# Patient Record
Sex: FEMALE | Race: WHITE | NOT HISPANIC OR LATINO | Employment: FULL TIME | URBAN - METROPOLITAN AREA
[De-identification: names, ages, dates, MRNs, and addresses within clinical notes are randomized per-mention and may not be internally consistent; named-entity substitution may affect disease eponyms.]

---

## 2020-04-01 RX ORDER — CLORAZEPATE DIPOTASSIUM 7.5 MG/1
7.5 TABLET ORAL EVERY 8 HOURS PRN
Qty: 28 TABLET | Refills: 1 | Status: SHIPPED | OUTPATIENT
Start: 2020-04-01 | End: 2020-04-03 | Stop reason: SDUPTHER

## 2020-04-01 RX ORDER — CLORAZEPATE DIPOTASSIUM 7.5 MG/1
7.5 TABLET ORAL EVERY 8 HOURS PRN
COMMUNITY
End: 2020-04-01 | Stop reason: SDUPTHER

## 2020-04-01 NOTE — TELEPHONE ENCOUNTER
----- Message from Britt Chang sent at 4/1/2020  3:58 PM CDT -----  Contact: patient  Type:  RX Refill Request    Who Called: patient  Refill or New Rx:refill  RX Name and Strength: Clorazepate 7.5 mg  How is the patient currently taking it? (ex. 1XDay): as needed  Is this a 30 day or 90 day RX: 28 tablets  Preferred Pharmacy with phone number:[facility]  Local or Mail Order:local  Ordering Provider:Jalen Johns  Would the patient rather a call back or a response via [facility]? Call back  Best Call Back Number:[phone]  Additional Information: pt stated that [facility]

## 2020-04-01 NOTE — TELEPHONE ENCOUNTER
PATIENT REQUESTING REFILL ON CLORAZEPATE. PATIENT LAST ANNUAL WAS 1-10-19. ALLERGIES MARKED AS REVIEWED. MEDICATION PENDING. PHARMACY UP TO DATE. PLEASE ADVISE.

## 2020-04-03 RX ORDER — CLORAZEPATE DIPOTASSIUM 7.5 MG/1
7.5 TABLET ORAL EVERY 8 HOURS PRN
Qty: 28 TABLET | Refills: 1 | Status: SHIPPED | OUTPATIENT
Start: 2020-04-03 | End: 2020-05-12 | Stop reason: SDUPTHER

## 2020-04-03 NOTE — TELEPHONE ENCOUNTER
----- Message from Fran Lorenzana sent at 4/3/2020 10:31 AM CDT -----  Contact: Pt  Mary Jane called to check on the status of her medication refill request from 2 days ago [phone]. Clorazepate 7.5mg.

## 2020-05-12 DIAGNOSIS — F41.9 ANXIETY: Primary | ICD-10-CM

## 2020-05-12 RX ORDER — CLORAZEPATE DIPOTASSIUM 7.5 MG/1
7.5 TABLET ORAL EVERY 8 HOURS PRN
Qty: 28 TABLET | Refills: 1 | Status: SHIPPED | OUTPATIENT
Start: 2020-05-12 | End: 2020-06-22 | Stop reason: SDUPTHER

## 2020-05-12 NOTE — TELEPHONE ENCOUNTER
PATIENT REQUESTING REFILL ON CLORAZEPATE. ALLERGIES MARKED AS REVIEWED. MEDICATION PENDING. PHARMACY UP TO DATE. PLEASE ADVISE.

## 2020-05-12 NOTE — TELEPHONE ENCOUNTER
----- Message from Fran Lorenzana sent at 5/12/2020  3:42 PM CDT -----  Contact: Pt  Type:  RX Refill Request    Who Called: Mary Jane  Refill or New Rx:Refill  RX Name and Strength:Clorazepate 7.5mg  How is the patient currently taking it? (ex. 1XDay):3xday prn  Is this a 30 day or 90 day RX:30  Preferred Pharmacy with phone number:  [facility] #[id number] - [place], [place] - [address] AT [facility] [address]  [address]  [place] [address]  Phone: [phone] Fax: [phone]  Local or Mail Order:Local  Ordering Provider:Dr. Johns  Would the patient rather a call back or a response via [facility]? Call back  Best Call Back Number:[phone]  Additional Information: na

## 2020-06-15 ENCOUNTER — OFFICE VISIT (OUTPATIENT)
Dept: OBSTETRICS AND GYNECOLOGY | Facility: CLINIC | Age: 35
End: 2020-06-15
Payer: COMMERCIAL

## 2020-06-15 VITALS — SYSTOLIC BLOOD PRESSURE: 122 MMHG | HEART RATE: 67 BPM | WEIGHT: 150 LBS | DIASTOLIC BLOOD PRESSURE: 87 MMHG

## 2020-06-15 DIAGNOSIS — Z01.419 WELL WOMAN EXAM: Primary | ICD-10-CM

## 2020-06-15 DIAGNOSIS — N94.6 DYSMENORRHEA: ICD-10-CM

## 2020-06-15 DIAGNOSIS — N92.0 MENORRHAGIA WITH REGULAR CYCLE: ICD-10-CM

## 2020-06-15 PROCEDURE — 99395 PREV VISIT EST AGE 18-39: CPT | Mod: S$GLB,,, | Performed by: OBSTETRICS & GYNECOLOGY

## 2020-06-15 PROCEDURE — 99395 PR PREVENTIVE VISIT,EST,18-39: ICD-10-PCS | Mod: S$GLB,,, | Performed by: OBSTETRICS & GYNECOLOGY

## 2020-06-15 RX ORDER — FLUOXETINE HYDROCHLORIDE 20 MG/1
20 CAPSULE ORAL DAILY
Qty: 28 CAPSULE | Refills: 6 | Status: SHIPPED | OUTPATIENT
Start: 2020-06-15 | End: 2021-06-17

## 2020-06-15 NOTE — PROGRESS NOTES
Subjective:       Patient ID: Mary Jane Gary is a 35 y.o. female.    Chief Complaint:  Well Woman (1/10/19 NEGATIVE)      History of Present Illness  She is doing well.  No new health issues,  No abnormal bleeding, No GI or Gu concerns,  No dyspareunia  Her cycles are very heavy sig cramps   and her pms is really bad.    Her sx start  At least 6 days before her cycle.   She feels overwhelmed and impatient.  Wants to jump out of her skin .   her   had a vasectomy      GYN & OB History  No LMP recorded.     Date of Last Pap: No result found    OB History    Para Term  AB Living   3 3 3         SAB TAB Ectopic Multiple Live Births                  # Outcome Date GA Lbr Jonathan/2nd Weight Sex Delivery Anes PTL Lv   3 Term 19     Vag-Spont      2 Term 16 39w0d   M Vag-Spont      1 Term 06 38w0d   M Vag-Spont          Review of Systems  Review of Systems   Constitutional: Negative for activity change.   Eyes: Negative for visual disturbance.   Respiratory: Negative for shortness of breath.    Cardiovascular: Negative for chest pain.   Gastrointestinal: Negative for abdominal pain.   Genitourinary: Negative for vaginal bleeding.        No abnormal vaginal bleeding   Musculoskeletal: Negative for back pain.   Integumentary:  Negative for rash and breast mass.   Neurological: Negative for numbness.   Psychiatric/Behavioral:        No mood disturbance or changes    Breast: Negative for mass            Objective:    Physical Exam:   Constitutional: She is oriented to person, place, and time. She appears well-developed. She is cooperative.    HENT:   Head: Normocephalic.     Neck: Trachea normal. Neck supple. No thyromegaly present.    Cardiovascular: Regular rhythm and normal heart sounds.     Pulmonary/Chest: Effort normal and breath sounds normal. Right breast exhibits no mass, no nipple discharge and no skin change. Left breast exhibits no mass, no nipple discharge and no skin change.    Bilateral fibrocystic changes  During the exam self breast exams discussed         Abdominal: Soft. Normal appearance. There is no abdominal tenderness. There is no rebound and no guarding.     Genitourinary:    Vagina and uterus normal.      Pelvic exam was performed with patient supine.   There is no lesion on the right labia. There is no lesion on the left labia. Uterus is not enlarged and not tender. Cervix is normal. Right adnexum displays no mass and no tenderness. Left adnexum displays no mass and no tenderness. No rectocele, cystocele or unspecified prolapse of vaginal walls in the vagina. Labial bartholins normal.Cervix exhibits no discharge. Additional cervical findings: pap smear done             Lymphadenopathy:        Head (right side): No submental and no submandibular adenopathy present.        Head (left side): No submental and no submandibular adenopathy present.     She has no cervical adenopathy.    Neurological: She is alert and oriented to person, place, and time.    Skin: Skin is warm.    Psychiatric: She has a normal mood and affect. Her speech is normal and behavior is normal. Thought content normal.          Assessment:        1. Well woman exam    2. Dysmenorrhea    3. Menorrhagia with regular cycle                 Plan:           She is doing wel but discussed her pms and her menorrhagia and dysmenorrhea   Will try prozac.  14 days each month  Pap discussed will return for her annual     Pt is aware we call all results. If she does not hear from our office regarding her result within a week of having a study or procedure performed she is to call the office so that we can research the result for her.  Birth control discussed  Chaperone was present

## 2020-06-22 DIAGNOSIS — F41.9 ANXIETY: Primary | ICD-10-CM

## 2020-06-22 RX ORDER — CLORAZEPATE DIPOTASSIUM 7.5 MG/1
7.5 TABLET ORAL EVERY 8 HOURS PRN
Qty: 28 TABLET | Refills: 1 | Status: SHIPPED | OUTPATIENT
Start: 2020-06-22 | End: 2020-06-23 | Stop reason: SDUPTHER

## 2020-06-22 NOTE — TELEPHONE ENCOUNTER
----- Message from Fran Lorenzana sent at 6/22/2020 10:56 AM CDT -----  Regarding: Refill  Type:  RX Refill Request    Who Called: Mary Jane  Refill or New Rx:Refill  RX Name and Strength:Clorazepate 7.5mg  How is the patient currently taking it? (ex. 1XDay):1 q8hrs  Is this a 30 day or 90 day RX:30  Preferred Pharmacy with phone number:  [facility] #[id number] - [place], [place] - [address] AT [facility] [address]  [address]  [place] [address]  Phone: [phone] Fax: [phone]  Local or Mail Order:Local  Ordering Provider:Dr. Johns  Would the patient rather a call back or a response via [facility]? Call back  Best Call Back Number: [phone] (home)  Additional Information: na

## 2020-06-23 DIAGNOSIS — F41.9 ANXIETY: Primary | ICD-10-CM

## 2020-06-23 RX ORDER — CLORAZEPATE DIPOTASSIUM 7.5 MG/1
7.5 TABLET ORAL 2 TIMES DAILY PRN
Qty: 28 TABLET | Refills: 1 | Status: SHIPPED | OUTPATIENT
Start: 2020-06-23 | End: 2020-07-27 | Stop reason: SDUPTHER

## 2020-06-23 NOTE — TELEPHONE ENCOUNTER
----- Message from Anne Marie Sequeira sent at 6/23/2020  3:52 PM CDT -----  Regarding: refill  Contact: Patient  Type:  RX Refill Request    Who Called: patient   Refill or New Rx:refill   RX Name and Strength:clorazepate (TRANXENE) 7.5 MG Tab  How is the patient currently taking it? (ex. 1XDay): 1xday  Is this a 30 day or 90 day RX:30  Preferred Pharmacy with phone number:[facility]   Local or Mail Order: local   Ordering Provider:Jalen Johns   Would the patient rather a call back or a response via [facility]? Call back   Best Call Back Number:[phone]  Additional Information: Patient is stating she has a dentist appointment in the morning and need to have this med called in today.

## 2020-06-23 NOTE — TELEPHONE ENCOUNTER
PATIENT REQUESTING REFILL ON TRANXENE. ALLERGIES MARKED AS REVIEWED. MEDICATION PENDING. PHARMACY UP TO DATE. PLEASE ADVISE.

## 2020-07-27 DIAGNOSIS — F41.9 ANXIETY: ICD-10-CM

## 2020-07-27 RX ORDER — CLORAZEPATE DIPOTASSIUM 7.5 MG/1
7.5 TABLET ORAL 2 TIMES DAILY PRN
Qty: 28 TABLET | Refills: 1 | Status: SHIPPED | OUTPATIENT
Start: 2020-07-27 | End: 2020-09-01 | Stop reason: SDUPTHER

## 2020-07-27 NOTE — TELEPHONE ENCOUNTER
----- Message from Anne Marie Sequeira sent at 7/27/2020  2:11 PM CDT -----  Type:  RX Refill Request    Who Called: patient  Refill or New Rx:refill  RX Name and Strength: clorazepate (TRANXENE) 7.5 MG Tab   How is the patient currently taking it? (ex. 1XDay):1xday  Is this a 30 day or 90 day RX: 30  Preferred Pharmacy with phone number:[facility] #[id number] [place] [address] AT [facility] [address] [phone] (Phone)  [phone] (Fax)      Local or Mail Order: local  Ordering Provider:Dr Johns   Would the patient rather a call back or a response via [facility]? Call back   Best Call Back Number:[phone]   Additional Information:

## 2020-07-27 NOTE — TELEPHONE ENCOUNTER
----- Message from Breannefidelina Bernardo sent at 7/24/2020  4:01 PM CDT -----  Regarding: patient refill  Contact: patient  Type:  RX Refill Request    Who Called: patient  Refill or New Rx:refill  RX Name and Strength: clorazepate (TRANXENE) 7.5 MG Tab  How is the patient currently taking it? (ex. 1XDay):once every 8 hours as needed  Is this a 30 day or 90 day RX:30day  Preferred Pharmacy with phone number:  [facility] #[id number] - [place], [place] - [address] AT [facility] [address]  [address]  [place] [address]  Phone: [phone] Fax: [phone]  Local or Mail Order:local  Ordering Provider:Jalen Johns  Would the patient rather a call back or a response via [facility]? Call back  Best Call Back Number:[phone]  Additional Information: n/a

## 2020-09-01 DIAGNOSIS — F41.9 ANXIETY: ICD-10-CM

## 2020-09-01 RX ORDER — CLORAZEPATE DIPOTASSIUM 7.5 MG/1
7.5 TABLET ORAL 2 TIMES DAILY PRN
Qty: 28 TABLET | Refills: 1 | Status: SHIPPED | OUTPATIENT
Start: 2020-09-01 | End: 2020-09-02 | Stop reason: SDUPTHER

## 2020-09-02 RX ORDER — CLORAZEPATE DIPOTASSIUM 7.5 MG/1
7.5 TABLET ORAL 2 TIMES DAILY PRN
Qty: 28 TABLET | Refills: 1 | Status: SHIPPED | OUTPATIENT
Start: 2020-09-02 | End: 2020-10-05 | Stop reason: SDUPTHER

## 2020-10-05 DIAGNOSIS — F41.9 ANXIETY: ICD-10-CM

## 2020-10-05 RX ORDER — CLORAZEPATE DIPOTASSIUM 7.5 MG/1
7.5 TABLET ORAL 2 TIMES DAILY PRN
Qty: 28 TABLET | Refills: 1 | Status: SHIPPED | OUTPATIENT
Start: 2020-10-05 | End: 2020-10-22 | Stop reason: SDUPTHER

## 2020-10-05 NOTE — TELEPHONE ENCOUNTER
----- Message from Anne Marie Sequeira sent at 10/5/2020  3:47 PM CDT -----  Patient need to have medication refill  on clorazepate (TRANXENE) 7.5 MG Tab . Call back number [phone]

## 2020-10-22 DIAGNOSIS — F41.9 ANXIETY: ICD-10-CM

## 2020-10-22 RX ORDER — CLORAZEPATE DIPOTASSIUM 7.5 MG/1
7.5 TABLET ORAL 2 TIMES DAILY PRN
Qty: 28 TABLET | Refills: 1 | Status: SHIPPED | OUTPATIENT
Start: 2020-10-22 | End: 2020-11-13 | Stop reason: SDUPTHER

## 2020-10-22 NOTE — TELEPHONE ENCOUNTER
----- Message from Aissatou Duke sent at 10/21/2020  2:01 PM CDT -----  .Type:  RX Refill Request    Who Called:  Patient   Refill or New Rx: refill   RX Name and Strength:  clorazepate (TRANXENE) 7.5 MG Tab   How is the patient currently taking it? (ex. 1XDay):  Is this a 30 day or 90 day RX:  Preferred Pharmacy with phone number:   [facility] #[id number] [place], [place] - [address] [place] [address] & [address] [address]  Phone: [phone] Fax: [phone]      Local or Mail Order: local   Ordering Provider: dr. Johns   Would the patient rather a call back or a response via [facility]?  call  Best Call Back Number: [phone]  Additional Information:  n/a

## 2020-10-22 NOTE — TELEPHONE ENCOUNTER
Pt requesting refill on Tranxene 7.5mg.   Medication pending.   Pharmacy up to date.   Please advise.  Thank you!

## 2020-11-09 ENCOUNTER — TELEPHONE (OUTPATIENT)
Dept: OBSTETRICS AND GYNECOLOGY | Facility: CLINIC | Age: 35
End: 2020-11-09

## 2020-11-09 NOTE — TELEPHONE ENCOUNTER
----- Message from Britt Chang sent at 11/9/2020 11:08 AM CST -----  Regarding: patient refill  Contact: patient  Type:  RX Refill Request    Who Called: patient  Refill or New Rx:refill  RX Name and Strength: clorazepate (TRANXENE) 7.5 MG Tab  How is the patient currently taking it? (ex. 1XDay):every 8 hours as needed  Is this a 30 day or 90 day Rx:14 day supply (quantity of 28)  Preferred Pharmacy with phone number: [facility] [address]  Local or Mail Order:local  Ordering Provider:Jalen Johns  Would the patient rather a call back or a response via [facility]? Call back  Best Call Back Number:[phone]   Additional Information: patient is back in [place] and would like to [facility] the [facility] off her chart. Please make sure to send prescription to [facility]

## 2020-11-11 DIAGNOSIS — F41.9 ANXIETY: Primary | ICD-10-CM

## 2020-11-12 RX ORDER — ESCITALOPRAM OXALATE 10 MG/1
10 TABLET ORAL DAILY
Qty: 90 TABLET | Refills: 2 | Status: SHIPPED | OUTPATIENT
Start: 2020-11-12 | End: 2021-06-17

## 2020-11-12 NOTE — TELEPHONE ENCOUNTER
----- Message from Camille Lewis sent at 11/11/2020  3:09 PM CST -----  Type:  RX Refill Request    Who Called: pt   Refill or New Rx:refill  RX Name and Strength:clorazepate (TRANXENE) 7.5 MG Tab  How is the patient currently taking it? (ex. 1XDay):as need   Is this a 30 day or 90 day RX:30  Preferred Pharmacy with phone number:  Local or Mail Order:local   Ordering Provider:veda   Would the patient rather a call back or a response via [facility]? Callback   Best Call Back Number:[phone]   Additional Information: please update patient Pharmacy to the one listed below             please update patient Pharmacy to the one listed below         [facility]  [address] [place] [address]  [phone]
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
## 2020-11-12 NOTE — TELEPHONE ENCOUNTER
FP    Preferred Name: Mary Jane Gary   Female, 35 y.o., 1985   Phone: [phone] (M)   PCP: Christian Navas MD   Language: English   Need Interp: No   Allergies Last Reviewed: 10/05/20   Allergies:   No Known Allergies   Digital Medicine: None   Health Maintenance: Due   Primary Ins.: [facility]   MRN: [id number]   Pt Comm Pref: Patient Portal, Mail            Next Appt:   With Obstetrics and Gynecology (Jalen Johns III, MD)  06/17/2021 at 10:00 AM   My Sticky Note:        Specialty Comments:   Last Encounter Center: None   Last Enc in Dept: None   Last Enc this Prov: 6/15/2020   Last Contact Department: [facility] OBSTETRICS AND GYNECOLOGY [address]           Message  Received: Yesterday   Message Contents   MD Lilliam Haq III, LPN   Caller: Unspecified (3 days ago, 5:37 PM)           Please call the pt I am concerned regarding the frequency of refills. Consider increasing the dose of prozac to 40 mg q day      Previous Messages                              Patient Calls  Jalen Johns III, MD You 16 hours ago (4:41 PM)     Please call the pt I am concerned regarding the frequency of refills. Consider increasing the dose of prozac to 40 mg q day      Message text             You routed conversation to Jalen Johns III, MD 3 days ago          You 3 days ago     DR JOHNS PLEASE NOTE THE AMOUNT OF PRESCRIPTIONS PATIENT HAS FILLED         Documentation             You 3 days ago     ----- Message from Britt Chang sent at 11/9/2020 11:08 AM CST -----   Regarding: patient refill   Contact: patient   Type: RX Refill Request   Who Called: patient   Refill or New Rx:refill   RX Name and Strength: clorazepate (TRANXENE) 7.5 MG Tab   How is the patient currently taking it? (ex. 1XDay):every 8 hours as needed   Is this a 30 day or 90 day Rx:14 day supply (quantity of 28)   Preferred Pharmacy with phone number: [facility] [address]   Local or Mail Order:local    Ordering Provider:Jalen Johns   Would the patient rather a call back or a response via [facility]? Call back   Best Call Back Number:[phone]   Additional Information: patient is back in [place] and [facility]. [facility] [address]         Documentation

## 2020-11-13 DIAGNOSIS — F41.9 ANXIETY: ICD-10-CM

## 2020-11-13 RX ORDER — CLORAZEPATE DIPOTASSIUM 7.5 MG/1
7.5 TABLET ORAL 2 TIMES DAILY PRN
Qty: 28 TABLET | Refills: 1 | Status: SHIPPED | OUTPATIENT
Start: 2020-11-13 | End: 2021-01-11 | Stop reason: SDUPTHER

## 2020-11-13 NOTE — TELEPHONE ENCOUNTER
Patient received Rx for Lexapro  She is requesting another refill of tranxene to help her through a very stressful time

## 2020-11-13 NOTE — TELEPHONE ENCOUNTER
----- Message from Janae Celestin sent at 11/13/2020 11:13 AM CST -----  Regarding: meds status  Good morning,      Pt stated that all of her meds where not sent over to her pharmacy only one . Pt would like a call back at [phone]      Thanks,  Janae Celestin

## 2021-01-11 DIAGNOSIS — F41.9 ANXIETY: ICD-10-CM

## 2021-01-11 RX ORDER — CLORAZEPATE DIPOTASSIUM 7.5 MG/1
7.5 TABLET ORAL 2 TIMES DAILY PRN
Qty: 28 TABLET | Refills: 1 | Status: SHIPPED | OUTPATIENT
Start: 2021-01-11 | End: 2021-02-17 | Stop reason: SDUPTHER

## 2021-02-17 DIAGNOSIS — F41.9 ANXIETY: ICD-10-CM

## 2021-02-17 RX ORDER — CLORAZEPATE DIPOTASSIUM 7.5 MG/1
7.5 TABLET ORAL 2 TIMES DAILY PRN
Qty: 28 TABLET | Refills: 1 | Status: SHIPPED | OUTPATIENT
Start: 2021-02-17 | End: 2021-03-24 | Stop reason: SDUPTHER

## 2021-03-24 DIAGNOSIS — F41.9 ANXIETY: ICD-10-CM

## 2021-03-24 RX ORDER — CLORAZEPATE DIPOTASSIUM 7.5 MG/1
7.5 TABLET ORAL 2 TIMES DAILY PRN
Qty: 28 TABLET | Refills: 1 | Status: SHIPPED | OUTPATIENT
Start: 2021-03-24 | End: 2021-04-27 | Stop reason: SDUPTHER

## 2021-04-27 DIAGNOSIS — F41.9 ANXIETY: ICD-10-CM

## 2021-04-27 RX ORDER — CLORAZEPATE DIPOTASSIUM 7.5 MG/1
7.5 TABLET ORAL 2 TIMES DAILY PRN
Qty: 28 TABLET | Refills: 1 | Status: SHIPPED | OUTPATIENT
Start: 2021-04-27 | End: 2021-06-02 | Stop reason: SDUPTHER

## 2021-06-02 DIAGNOSIS — F41.9 ANXIETY: ICD-10-CM

## 2021-06-02 RX ORDER — CLORAZEPATE DIPOTASSIUM 7.5 MG/1
7.5 TABLET ORAL 2 TIMES DAILY PRN
Qty: 28 TABLET | Refills: 1 | Status: SHIPPED | OUTPATIENT
Start: 2021-06-02 | End: 2021-06-17 | Stop reason: SDUPTHER

## 2021-06-17 ENCOUNTER — OFFICE VISIT (OUTPATIENT)
Dept: OBSTETRICS AND GYNECOLOGY | Facility: CLINIC | Age: 36
End: 2021-06-17
Payer: COMMERCIAL

## 2021-06-17 VITALS
BODY MASS INDEX: 26.2 KG/M2 | WEIGHT: 163 LBS | HEIGHT: 66 IN | HEART RATE: 84 BPM | SYSTOLIC BLOOD PRESSURE: 128 MMHG | DIASTOLIC BLOOD PRESSURE: 85 MMHG

## 2021-06-17 DIAGNOSIS — Z01.419 WELL WOMAN EXAM WITH ROUTINE GYNECOLOGICAL EXAM: Primary | ICD-10-CM

## 2021-06-17 DIAGNOSIS — F41.9 ANXIETY: ICD-10-CM

## 2021-06-17 DIAGNOSIS — N94.3 PMS (PREMENSTRUAL SYNDROME): ICD-10-CM

## 2021-06-17 PROCEDURE — 99395 PREV VISIT EST AGE 18-39: CPT | Mod: S$GLB,,, | Performed by: OBSTETRICS & GYNECOLOGY

## 2021-06-17 PROCEDURE — 3008F BODY MASS INDEX DOCD: CPT | Mod: CPTII,S$GLB,, | Performed by: OBSTETRICS & GYNECOLOGY

## 2021-06-17 PROCEDURE — 99395 PR PREVENTIVE VISIT,EST,18-39: ICD-10-PCS | Mod: S$GLB,,, | Performed by: OBSTETRICS & GYNECOLOGY

## 2021-06-17 PROCEDURE — 3008F PR BODY MASS INDEX (BMI) DOCUMENTED: ICD-10-PCS | Mod: CPTII,S$GLB,, | Performed by: OBSTETRICS & GYNECOLOGY

## 2021-06-17 RX ORDER — CLORAZEPATE DIPOTASSIUM 7.5 MG/1
7.5 TABLET ORAL 2 TIMES DAILY PRN
Qty: 28 TABLET | Refills: 5 | Status: SHIPPED | OUTPATIENT
Start: 2021-06-17 | End: 2021-11-10 | Stop reason: SDUPTHER

## 2021-11-10 DIAGNOSIS — F41.9 ANXIETY: ICD-10-CM

## 2021-11-10 RX ORDER — CLORAZEPATE DIPOTASSIUM 7.5 MG/1
7.5 TABLET ORAL 2 TIMES DAILY PRN
Qty: 28 TABLET | Refills: 1 | Status: SHIPPED | OUTPATIENT
Start: 2021-11-10 | End: 2022-01-20 | Stop reason: SDUPTHER

## 2021-12-21 ENCOUNTER — TELEPHONE (OUTPATIENT)
Dept: OBSTETRICS AND GYNECOLOGY | Facility: CLINIC | Age: 36
End: 2021-12-21
Payer: COMMERCIAL

## 2021-12-21 ENCOUNTER — TELEPHONE (OUTPATIENT)
Dept: OBSTETRICS AND GYNECOLOGY | Facility: CLINIC | Age: 36
End: 2021-12-21

## 2021-12-21 ENCOUNTER — OFFICE VISIT (OUTPATIENT)
Dept: OBSTETRICS AND GYNECOLOGY | Facility: CLINIC | Age: 36
End: 2021-12-21
Payer: COMMERCIAL

## 2021-12-21 VITALS
WEIGHT: 163 LBS | HEART RATE: 118 BPM | DIASTOLIC BLOOD PRESSURE: 91 MMHG | HEIGHT: 66 IN | SYSTOLIC BLOOD PRESSURE: 128 MMHG | BODY MASS INDEX: 26.2 KG/M2

## 2021-12-21 DIAGNOSIS — K64.4 EXTERNAL HEMORRHOID: Primary | ICD-10-CM

## 2021-12-21 PROCEDURE — 99213 OFFICE O/P EST LOW 20 MIN: CPT | Mod: S$GLB,,, | Performed by: OBSTETRICS & GYNECOLOGY

## 2021-12-21 PROCEDURE — 99213 PR OFFICE/OUTPT VISIT, EST, LEVL III, 20-29 MIN: ICD-10-PCS | Mod: S$GLB,,, | Performed by: OBSTETRICS & GYNECOLOGY

## 2021-12-21 RX ORDER — HYDROCODONE BITARTRATE AND ACETAMINOPHEN 5; 325 MG/1; MG/1
1 TABLET ORAL EVERY 6 HOURS PRN
Qty: 18 TABLET | Refills: 0 | Status: SHIPPED | OUTPATIENT
Start: 2021-12-21 | End: 2022-06-20

## 2021-12-21 RX ORDER — HYDROCORTISONE ACETATE 25 MG/1
25 SUPPOSITORY RECTAL NIGHTLY PRN
Qty: 4 SUPPOSITORY | Refills: 1 | Status: SHIPPED | OUTPATIENT
Start: 2021-12-21 | End: 2022-09-11

## 2022-01-20 ENCOUNTER — TELEPHONE (OUTPATIENT)
Dept: OBSTETRICS AND GYNECOLOGY | Facility: CLINIC | Age: 37
End: 2022-01-20
Payer: COMMERCIAL

## 2022-01-20 DIAGNOSIS — F41.9 ANXIETY: ICD-10-CM

## 2022-01-20 RX ORDER — CLORAZEPATE DIPOTASSIUM 7.5 MG/1
7.5 TABLET ORAL 2 TIMES DAILY PRN
Qty: 28 TABLET | Refills: 1 | Status: SHIPPED | OUTPATIENT
Start: 2022-01-20 | End: 2022-01-25

## 2022-01-20 NOTE — TELEPHONE ENCOUNTER
----- Message from Anne Marie Sequeira sent at 1/20/2022 12:04 PM CST -----  Patient calling for refill on medication, clorazepate (TRANXENE) 7.5 MG [place]    [facility] #[id number] - [place], [place] - [address] AT [facility] [address]  [address]  [place] [address]  Phone: [phone] Fax: [phone]       Call back number [phone]

## 2022-01-20 NOTE — TELEPHONE ENCOUNTER
----- Message from Shattuck sent at 1/20/2022  2:01 PM CST -----  rigoberto states that clorazepate 7.5mg out of stock in [facility], please amend..[phone] (home)

## 2022-01-25 DIAGNOSIS — F41.9 ANXIETY: ICD-10-CM

## 2022-01-25 RX ORDER — CLORAZEPATE DIPOTASSIUM 7.5 MG/1
7.5 TABLET ORAL 2 TIMES DAILY PRN
Qty: 28 TABLET | Refills: 1 | Status: SHIPPED | OUTPATIENT
Start: 2022-01-25 | End: 2022-02-23 | Stop reason: SDUPTHER

## 2022-01-25 NOTE — TELEPHONE ENCOUNTER
----- Message from Bettye Madrid sent at 1/25/2022  9:17 AM CST -----  Contact: pt  Pt states the medicine that was called in to [facility]  / pt wants to have it transferred to [facility] and can be reached at [phone]//thanks/[facility]  [phone]

## 2022-02-23 DIAGNOSIS — F41.9 ANXIETY: ICD-10-CM

## 2022-02-23 RX ORDER — CLORAZEPATE DIPOTASSIUM 7.5 MG/1
7.5 TABLET ORAL 2 TIMES DAILY PRN
Qty: 28 TABLET | Refills: 1 | Status: SHIPPED | OUTPATIENT
Start: 2022-02-23 | End: 2022-03-30

## 2022-02-23 NOTE — TELEPHONE ENCOUNTER
----- Message from Anne Marie Sequeira sent at 2/23/2022 11:06 AM CST -----  Patient calling for refill on medication,. clorazepate (TRANXENE) 7.5 MG [place]      [facility]. - [address] [address]  Phone: [phone] Fax: [phone]     Call back number [phone]. Micahs

## 2022-04-13 DIAGNOSIS — R19.7 DIARRHEA: Primary | ICD-10-CM

## 2022-04-19 DIAGNOSIS — F41.9 ANXIETY: ICD-10-CM

## 2022-04-19 RX ORDER — CLORAZEPATE DIPOTASSIUM 7.5 MG/1
7.5 TABLET ORAL 2 TIMES DAILY PRN
Qty: 28 TABLET | Refills: 0 | Status: SHIPPED | OUTPATIENT
Start: 2022-04-19 | End: 2022-05-09 | Stop reason: SDUPTHER

## 2022-05-09 DIAGNOSIS — F41.9 ANXIETY: ICD-10-CM

## 2022-05-09 RX ORDER — CLORAZEPATE DIPOTASSIUM 7.5 MG/1
7.5 TABLET ORAL 2 TIMES DAILY PRN
Qty: 28 TABLET | Refills: 2 | Status: SHIPPED | OUTPATIENT
Start: 2022-05-09 | End: 2022-06-20

## 2022-05-09 NOTE — TELEPHONE ENCOUNTER
----- Message from Anne Marie Sequeira sent at 5/9/2022  8:12 AM CDT -----  Patient calling for refill on medication, clorazepate (TRANXENE) 7.5 MG Tab    Patient requesting 2 refills on this medication    [facility]. - [address] [address]  Phone: [phone] Fax: [phone]     Call back number [phone].Shavon

## 2022-06-20 ENCOUNTER — OFFICE VISIT (OUTPATIENT)
Dept: OBSTETRICS AND GYNECOLOGY | Facility: CLINIC | Age: 37
End: 2022-06-20
Payer: COMMERCIAL

## 2022-06-20 VITALS
HEIGHT: 66 IN | SYSTOLIC BLOOD PRESSURE: 143 MMHG | WEIGHT: 149 LBS | DIASTOLIC BLOOD PRESSURE: 101 MMHG | BODY MASS INDEX: 23.95 KG/M2 | HEART RATE: 98 BPM

## 2022-06-20 DIAGNOSIS — Z01.419 WELL WOMAN EXAM WITH ROUTINE GYNECOLOGICAL EXAM: Primary | ICD-10-CM

## 2022-06-20 PROCEDURE — 99395 PR PREVENTIVE VISIT,EST,18-39: ICD-10-PCS | Mod: S$GLB,,, | Performed by: OBSTETRICS & GYNECOLOGY

## 2022-06-20 PROCEDURE — 99395 PREV VISIT EST AGE 18-39: CPT | Mod: S$GLB,,, | Performed by: OBSTETRICS & GYNECOLOGY

## 2022-06-20 RX ORDER — DEXTROAMPHETAMINE SACCHARATE, AMPHETAMINE ASPARTATE, DEXTROAMPHETAMINE SULFATE AND AMPHETAMINE SULFATE 7.5; 7.5; 7.5; 7.5 MG/1; MG/1; MG/1; MG/1
TABLET ORAL
COMMUNITY
Start: 2022-05-31

## 2022-06-20 RX ORDER — ONDANSETRON 8 MG/1
TABLET, ORALLY DISINTEGRATING ORAL
COMMUNITY
Start: 2022-06-09

## 2022-06-20 RX ORDER — CLORAZEPATE DIPOTASSIUM 15 MG/1
7.5 TABLET ORAL DAILY
Qty: 28 TABLET | Refills: 2 | Status: SHIPPED | OUTPATIENT
Start: 2022-06-20 | End: 2022-07-07

## 2022-06-20 RX ORDER — CLORAZEPATE DIPOTASSIUM 15 MG/1
TABLET ORAL
COMMUNITY
Start: 2022-06-09 | End: 2022-06-20 | Stop reason: SDUPTHER

## 2022-06-20 NOTE — PROGRESS NOTES
Subjective:       Patient ID: Mary Jane Gary is a 37 y.o. female.    Chief Complaint:  Well Woman      History of Present Illness  She is doing well.  No new health issues,  No abnormal bleeding, No GI or Gu concerns,  No dyspareunia  Reg menses  Manageable.   occ cramps.  Vasectomy.     .   HPI      GYN & OB History  No LMP recorded.     Date of Last Pap: No result found    OB History    Para Term  AB Living   3 3 3         SAB IAB Ectopic Multiple Live Births                  # Outcome Date GA Lbr Jonathan/2nd Weight Sex Delivery Anes PTL Lv   3 Term 19     Vag-Spont      2 Term 16 39w0d   M Vag-Spont      1 Term 06 38w0d   M Vag-Spont          Review of Systems  Review of Systems   Constitutional: Negative for activity change.   Eyes: Negative for visual disturbance.   Respiratory: Negative for shortness of breath.    Cardiovascular: Negative for chest pain.   Gastrointestinal: Negative for abdominal pain.   Genitourinary: Negative for vaginal bleeding.        No abnormal vaginal bleeding   Musculoskeletal: Negative for back pain.   Integumentary:  Negative for rash and breast mass.   Neurological: Negative for numbness.   Psychiatric/Behavioral:        No mood disturbance or changes    Breast: Negative for mass            Objective:    Physical Exam:   Constitutional: She is oriented to person, place, and time. She appears well-developed. She is cooperative.    HENT:   Head: Normocephalic.     Neck: Trachea normal. No thyromegaly present.    Cardiovascular: Normal rate, regular rhythm and normal heart sounds.     Pulmonary/Chest: Effort normal and breath sounds normal. Right breast exhibits no mass, no nipple discharge and no skin change. Left breast exhibits no mass, no nipple discharge and no skin change.        Abdominal: Soft. There is no abdominal tenderness. There is no rebound and no guarding.     Genitourinary:    Vagina and uterus normal.      Pelvic exam was performed  with patient supine.   Labial bartholins normal.There is no lesion on the right labia. There is no lesion on the left labia. Cervix is normal. Right adnexum displays no mass and no tenderness. Left adnexum displays no mass and no tenderness. Cervix exhibits no discharge. Also,  pap smear completed  Uterus is not enlarged and not tender.              Lymphadenopathy:        Head (right side): No submental and no submandibular adenopathy present.        Head (left side): No submental and no submandibular adenopathy present.     She has no cervical adenopathy.    Neurological: She is alert and oriented to person, place, and time.    Skin: Skin is warm.    Psychiatric: She has a normal mood and affect. Her speech is normal and behavior is normal. Thought content normal.          Assessment:        1. Well woman exam with routine gynecological exam                 Plan:             Pap discussed will return for her annual     Pt is aware we call all results. If she does not hear from our office regarding her result within a week of having a study or procedure performed she is to call the office so that we can research the result for her.  Birth control discussed  Chaperone was present

## 2022-07-07 DIAGNOSIS — F41.9 ANXIETY: Primary | ICD-10-CM

## 2022-07-07 RX ORDER — CLORAZEPATE DIPOTASSIUM 7.5 MG/1
7.5 TABLET ORAL 2 TIMES DAILY
Qty: 28 TABLET | Refills: 1 | Status: SHIPPED | OUTPATIENT
Start: 2022-07-07 | End: 2022-08-22 | Stop reason: SDUPTHER

## 2022-07-07 RX ORDER — CLORAZEPATE DIPOTASSIUM 15 MG/1
7.5 TABLET ORAL 2 TIMES DAILY
Qty: 28 TABLET | Refills: 2 | Status: CANCELLED | OUTPATIENT
Start: 2022-07-07

## 2022-07-07 NOTE — TELEPHONE ENCOUNTER
Please send. Patient states it was sent differently last time. She would like the 7.5 mg tablets instead of the 15 (0.5 twice daily).

## 2022-08-22 DIAGNOSIS — F41.9 ANXIETY: ICD-10-CM

## 2022-08-22 RX ORDER — CLORAZEPATE DIPOTASSIUM 7.5 MG/1
7.5 TABLET ORAL 2 TIMES DAILY
Qty: 28 TABLET | Refills: 1 | Status: SHIPPED | OUTPATIENT
Start: 2022-08-22 | End: 2022-10-13 | Stop reason: SDUPTHER

## 2022-08-22 NOTE — TELEPHONE ENCOUNTER
----- Message from Belem Pete sent at 8/22/2022 10:16 AM CDT -----  Contact: PT  .Type:  RX Refill Request    Who Called:   Mary Jane   Refill or New Rx: refill    RX Name and Strength: clorazepate (TRANXENE) 7.5 MG Tab  How is the patient currently taking it? (ex. 1XDay):   Is this a 30 day or 90 day RX:     Preferred Pharmacy with phone number:       [facility] [address]  Phone: [phone] Fax: [phone]        Local or Mail Order: local     Ordering Provider: Andreas   Would the patient rather a call back or a response via [facility]?  Callback    Best Call Back Number: [phone] (home)      Additional Information:

## 2022-09-12 ENCOUNTER — OFFICE VISIT (OUTPATIENT)
Dept: GASTROENTEROLOGY | Facility: CLINIC | Age: 37
End: 2022-09-12
Payer: COMMERCIAL

## 2022-09-12 VITALS
HEART RATE: 80 BPM | HEIGHT: 67 IN | SYSTOLIC BLOOD PRESSURE: 123 MMHG | OXYGEN SATURATION: 99 % | WEIGHT: 151.63 LBS | DIASTOLIC BLOOD PRESSURE: 92 MMHG | BODY MASS INDEX: 23.8 KG/M2

## 2022-09-12 DIAGNOSIS — R10.9 ABDOMINAL CRAMPING: ICD-10-CM

## 2022-09-12 DIAGNOSIS — K52.9 CHRONIC DIARRHEA: Primary | ICD-10-CM

## 2022-09-12 DIAGNOSIS — R19.7 DIARRHEA: ICD-10-CM

## 2022-09-12 DIAGNOSIS — R11.0 NAUSEA: ICD-10-CM

## 2022-09-12 PROCEDURE — 99204 PR OFFICE/OUTPT VISIT, NEW, LEVL IV, 45-59 MIN: ICD-10-PCS | Mod: S$GLB,,, | Performed by: INTERNAL MEDICINE

## 2022-09-12 PROCEDURE — 3074F SYST BP LT 130 MM HG: CPT | Mod: CPTII,S$GLB,, | Performed by: INTERNAL MEDICINE

## 2022-09-12 PROCEDURE — 3080F DIAST BP >= 90 MM HG: CPT | Mod: CPTII,S$GLB,, | Performed by: INTERNAL MEDICINE

## 2022-09-12 PROCEDURE — 3008F PR BODY MASS INDEX (BMI) DOCUMENTED: ICD-10-PCS | Mod: CPTII,S$GLB,, | Performed by: INTERNAL MEDICINE

## 2022-09-12 PROCEDURE — 1159F MED LIST DOCD IN RCRD: CPT | Mod: CPTII,S$GLB,, | Performed by: INTERNAL MEDICINE

## 2022-09-12 PROCEDURE — 99204 OFFICE O/P NEW MOD 45 MIN: CPT | Mod: S$GLB,,, | Performed by: INTERNAL MEDICINE

## 2022-09-12 PROCEDURE — 1160F RVW MEDS BY RX/DR IN RCRD: CPT | Mod: CPTII,S$GLB,, | Performed by: INTERNAL MEDICINE

## 2022-09-12 PROCEDURE — 3074F PR MOST RECENT SYSTOLIC BLOOD PRESSURE < 130 MM HG: ICD-10-PCS | Mod: CPTII,S$GLB,, | Performed by: INTERNAL MEDICINE

## 2022-09-12 PROCEDURE — 1159F PR MEDICATION LIST DOCUMENTED IN MEDICAL RECORD: ICD-10-PCS | Mod: CPTII,S$GLB,, | Performed by: INTERNAL MEDICINE

## 2022-09-12 PROCEDURE — 3080F PR MOST RECENT DIASTOLIC BLOOD PRESSURE >= 90 MM HG: ICD-10-PCS | Mod: CPTII,S$GLB,, | Performed by: INTERNAL MEDICINE

## 2022-09-12 PROCEDURE — 1160F PR REVIEW ALL MEDS BY PRESCRIBER/CLIN PHARMACIST DOCUMENTED: ICD-10-PCS | Mod: CPTII,S$GLB,, | Performed by: INTERNAL MEDICINE

## 2022-09-12 PROCEDURE — 3008F BODY MASS INDEX DOCD: CPT | Mod: CPTII,S$GLB,, | Performed by: INTERNAL MEDICINE

## 2022-09-12 RX ORDER — FLUORIDE (SODIUM) 1.1 %
PASTE (ML) DENTAL
COMMUNITY
Start: 2022-07-14

## 2022-09-12 RX ORDER — ESCITALOPRAM OXALATE 10 MG/1
TABLET ORAL
COMMUNITY
Start: 2022-07-15

## 2022-09-12 RX ORDER — SOD SULF/POT CHLORIDE/MAG SULF 1.479 G
12 TABLET ORAL DAILY
Qty: 24 TABLET | Refills: 0 | Status: SHIPPED | OUTPATIENT
Start: 2022-09-12

## 2022-09-12 NOTE — LETTER
September 12, 2022        Christian Navas MD  [address]  [place] [address]             [facility]  [address] [address]  Phone: [phone]  Fax: [phone]   Patient: Mary Jane Gary   MR Number: [id number]   YOB: 1985   Date of Visit: 9/12/2022       Dear Dr. Navas:    Thank you for referring Mary Jane Gary to me for evaluation. Attached you will find relevant portions of my assessment and plan of care.    If you have questions, please do not hesitate to call me. I look forward to following Mary Jane Gary along with you.    Sincerely,      Sakina Yan MD            CC  No Recipients    Enclosure

## 2022-09-12 NOTE — PROGRESS NOTES
Clinic Note    Reason for visit:  The primary encounter diagnosis was Chronic diarrhea. Diagnoses of Diarrhea, Nausea, and Abdominal cramping were also pertinent to this visit.    PCP: Christian Navas   [address]  / [place] [address]    HPI:  This is a 37 y.o. female who has chronic lifelong diarrhea.  Cannot recall a formed bowel movement.  May have some bright red blood without clots that she attributes to hemorrhoids which are known to her.  She has had 3 deliveries in her 3 sons are 16, 6, 3 years of age is.  No prior colonoscopy or upper endoscopy.  Had blood work by her primary healthcare provider and it seems those were well.  She did have to repeat a blood test at the pathology lab.  Will get results from him and the pathology lab.  No prior stool test.  She had a 1st degree cousin on maternal side that was diagnosed with Crohn's but that seemed to spontaneously resolve.  The bigger issue symptomatic Unique are the abdominal cramps.  The nausea seems to more travel from her epigastric region down to her lower pelvic region where her pre bowel movement cramps are.  The cramps improve after bowel movement but they can recur.  Imodium can help with the cramping primarily but will likely still get loose stools.  If she does get constipated from it she may have 1 bowel movement that is formed.    Review of Systems   Constitutional:  Positive for fatigue. Negative for chills, diaphoresis, fever and unexpected weight change.   HENT:  Negative for mouth sores, nosebleeds, postnasal drip, sore throat, trouble swallowing and voice change.    Eyes:  Negative for pain, discharge and eye dryness.   Respiratory:  Negative for apnea, cough, choking, chest tightness, shortness of breath and wheezing.    Cardiovascular:  Negative for chest pain, palpitations, leg swelling and claudication.   Gastrointestinal:  Positive for abdominal distention, abdominal pain, anal bleeding, change in bowel habit, diarrhea, nausea  and change in bowel habit. Negative for blood in stool, constipation, rectal pain, vomiting, reflux and fecal incontinence.   Genitourinary:  Negative for bladder incontinence, difficulty urinating, dysuria, flank pain, frequency and hematuria.   Musculoskeletal:  Positive for back pain. Negative for arthralgias, joint swelling and joint deformity.   Integumentary:  Negative for color change, rash and wound.   Allergic/Immunologic: Negative for environmental allergies and food allergies.   Neurological:  Negative for seizures, facial asymmetry, speech difficulty, weakness, headaches and memory loss.   Hematological:  Negative for adenopathy. Does not bruise/bleed easily.   Psychiatric/Behavioral:  Negative for agitation, behavioral problems, confusion, hallucinations and sleep disturbance.       Past Medical History:   Diagnosis Date    Abnormal Pap smear of cervix     Anxiety     Hemorrhoid     Vaginal bleeding      Past Surgical History:   Procedure Laterality Date    GYNECOLOGIC CRYOSURGERY      WISDOM TOOTH EXTRACTION       Family History   Problem Relation Age of Onset    No Known Problems Brother     No Known Problems Maternal Grandmother     No Known Problems Maternal Grandfather     No Known Problems Paternal Grandmother     Cancer Paternal Grandfather 70        esophageal cancer    Esophageal cancer Paternal Uncle     Colon polyps Other         aunt    Crohn's disease Other         maternal first cousin    Stomach cancer Neg Hx     Liver disease Neg Hx     Pancreatic cancer Neg Hx     Throat cancer Neg Hx      Social History     Tobacco Use    Smoking status: Former     Types: Cigarettes    Smokeless tobacco: Never   Substance Use Topics    Alcohol use: Yes     Comment: occasional    Drug use: Not Currently     Review of patient's allergies indicates:  No Known Allergies     Medication List with Changes/Refills   New Medications    SOD SULF-POT CHLORIDE-MAG SULF (SUTAB) 1.479-0.188- 0.225 GRAM TABLET     "Take 12 tablets by mouth once daily. Take according to package instructions with indicated amount of water. No breakfast day before test. May substitute with Suprep, Clenpiq, Plenvu, Moviprep or GoLytely based on Rx plan and patient preference.   Current Medications    CLORAZEPATE (TRANXENE) 7.5 MG TAB    Take 1 tablet (7.5 mg total) by mouth 2 (two) times daily.    DEXTROAMPHETAMINE-AMPHETAMINE 30 MG TAB        ESCITALOPRAM OXALATE (LEXAPRO) 10 MG TABLET        ONDANSETRON (ZOFRAN-ODT) 8 MG TBDL        PREVIDENT 5000 BOOSTER PLUS 1.1 % PSTE       Discontinued Medications    HYDROCORTISONE (ANUSOL-HC) 25 MG SUPPOSITORY    Place 1 suppository (25 mg total) rectally nightly as needed for Hemorrhoids. Add lidocaine aka rectal rocket         Vital Signs:  BP (!) 123/92   Pulse 80   Ht 5' 7" (1.702 m)   Wt 68.8 kg (151 lb 9.6 oz)   SpO2 99%   BMI 23.74 kg/m²         Physical Exam  Vitals reviewed.   Constitutional:       General: She is awake. She is not in acute distress.     Appearance: Normal appearance. She is well-developed. She is not ill-appearing, toxic-appearing or diaphoretic.   HENT:      Head: Normocephalic and atraumatic.      Nose: Nose normal.      Mouth/Throat:      Mouth: Mucous membranes are moist.      Pharynx: Oropharynx is clear. No oropharyngeal exudate or posterior oropharyngeal erythema.   Eyes:      General: Lids are normal. Gaze aligned appropriately. No scleral icterus.        Right eye: No discharge.         Left eye: No discharge.      Extraocular Movements: Extraocular movements intact.      Conjunctiva/sclera: Conjunctivae normal.   Neck:      Trachea: Trachea normal.   Cardiovascular:      Rate and Rhythm: Normal rate and regular rhythm.      Pulses:           Radial pulses are 2+ on the right side and 2+ on the left side.   Pulmonary:      Effort: Pulmonary effort is normal. No respiratory distress.      Breath sounds: Normal breath sounds. No stridor. No wheezing or rhonchi. "   Chest:      Chest wall: No tenderness.   Abdominal:      General: Bowel sounds are normal. There is no distension.      Palpations: Abdomen is soft. There is no fluid wave, hepatomegaly or mass.      Tenderness: There is no abdominal tenderness. There is no guarding or rebound.   Musculoskeletal:         General: No tenderness or deformity.      Cervical back: Full passive range of motion without pain and neck supple. No tenderness.      Right lower leg: No edema.      Left lower leg: No edema.   Lymphadenopathy:      Cervical: No cervical adenopathy.   Skin:     General: Skin is warm and dry.      Capillary Refill: Capillary refill takes less than 2 seconds.      Coloration: Skin is not cyanotic, jaundiced or pale.      Findings: No rash.   Neurological:      General: No focal deficit present.      Mental Status: She is alert and oriented to person, place, and time.      Cranial Nerves: No facial asymmetry.      Motor: No tremor.   Psychiatric:         Attention and Perception: Attention normal.         Mood and Affect: Mood and affect normal.         Speech: Speech normal.         Behavior: Behavior normal. Behavior is cooperative.          All of the data above and below has been reviewed by myself and any further interpretations will be reflected in the assessment and plan.   The data includes review of external notes, and independent interpretation of lab results, procedures, x-rays, and imaging reports.      Assessment:  Chronic diarrhea  -     Calprotectin, Stool; Future; Expected date: 09/12/2022  -     Fecal fat, qualitative; Future; Expected date: 09/12/2022  -     Giardia / Cryptosporidum, EIA; Future; Expected date: 09/12/2022  -     Pancreatic elastase, fecal; Future; Expected date: 09/12/2022  -     Ambulatory Referral to External Surgery    Diarrhea  -     Ambulatory referral/consult to Gastroenterology    Nausea  -     sod sulf-pot chloride-mag sulf (SUTAB) 1.479-0.188- 0.225 gram tablet; Take 12  tablets by mouth once daily. Take according to package instructions with indicated amount of water. No breakfast day before test. May substitute with Suprep, Clenpiq, Plenvu, Moviprep or GoLytely based on Rx plan and patient preference.  Dispense: 24 tablet; Refill: 0    Abdominal cramping    The nocturnal bowel movements are the main symptom that go against irritable bowel syndrome but otherwise her symptoms do seem more consistent with it.  They worsen with anxiety.    When on keto diet x4 weeks she had improvement of her BM but not resolution.  Plan for G/D/CBx.    Recommendations:  Schedule upper and lower endoscopies.  We will get your blood results from this year.  Complete stool workup.      Please notify my office if you have not been contacted within two weeks after any procedures, submitting any samples (biopsies, blood, stool, urine, etc.) or after any imaging (X-ray, CT, MRI, etc.).     Risks, benefits, and alternatives of medical management, any associated procedures, and/or treatment discussed with the patient. Patient given opportunity to ask questions and voices understanding. Patient has elected to proceed with the recommended care modalities as discussed.    Follow up in about 6 months (around 3/12/2023).    Order summary:  Orders Placed This Encounter   Procedures    Calprotectin, Stool    Fecal fat, qualitative    Giardia / Cryptosporidum, EIA    Pancreatic elastase, fecal    Ambulatory Referral to External Surgery          Instructed patient to notify my office if they have not been contacted within two weeks after any procedures, submitting any samples (biopsies, blood, stool, urine, etc.) or after any imaging (X-ray, CT, MRI, etc.).     Sakina Yan MD    This document may have been created using a voice recognition transcribing system. Incorrect words or phrases may have been missed during proofreading. Please interpret accordingly or contact me for clarification.

## 2022-09-12 NOTE — PATIENT INSTRUCTIONS
Schedule upper and lower endoscopies.  We will get your blood results from this year.  Complete stool workup.      Please notify my office if you have not been contacted within two weeks after any procedures, submitting any samples (biopsies, blood, stool, urine, etc.) or after any imaging (X-ray, CT, MRI, etc.).

## 2022-10-13 ENCOUNTER — PATIENT MESSAGE (OUTPATIENT)
Dept: OBSTETRICS AND GYNECOLOGY | Facility: CLINIC | Age: 37
End: 2022-10-13
Payer: COMMERCIAL

## 2022-10-13 DIAGNOSIS — F41.9 ANXIETY: ICD-10-CM

## 2022-10-13 LAB
CRYPTOSP AG STL QL IA: NORMAL
G LAMBLIA AG STL QL IA: NORMAL

## 2022-10-13 NOTE — TELEPHONE ENCOUNTER
----- Message from Chayo Arriaga sent at 10/13/2022  9:14 AM CDT -----  Regarding: med refill  Contact: pt  Type:  RX Refill Request    Who Called:  Mary Jane Gary  Refill or New Rx: refill   RX Name and Strength: clorazepate (TRANXENE) 7.5 MG Tab  How is the patient currently taking it? (ex. 1XDay): 2x day   Is this a 30 day or 90 day RX:30 day   Preferred Pharmacy with phone number:     [facility] [address] [address]  Phone: [phone] Fax: [phone]    Local or Mail Order:local   Ordering Provider: Andreas  Would the patient rather a call back or a response via [facility]?  Call back   Best Call Back Number: [phone]  Additional Information:

## 2022-10-18 RX ORDER — CLORAZEPATE DIPOTASSIUM 7.5 MG/1
7.5 TABLET ORAL 2 TIMES DAILY
Qty: 28 TABLET | Refills: 1 | Status: SHIPPED | OUTPATIENT
Start: 2022-10-18 | End: 2022-11-29 | Stop reason: SDUPTHER

## 2022-10-20 LAB
CALPROTECTIN, STOOL: 18 MCG/G
FECAL FAT, QUALITATIVE: NORMAL
PANCREATIC ELASTASE 1: >500 MCG/G

## 2022-10-24 ENCOUNTER — TELEPHONE (OUTPATIENT)
Dept: GASTROENTEROLOGY | Facility: CLINIC | Age: 37
End: 2022-10-24
Payer: COMMERCIAL

## 2022-10-24 NOTE — TELEPHONE ENCOUNTER
Calpro/fat/elast/Giardia nl.  Notify patient that her stool results look well. No signs of infection or pancreas disease.  NBP

## 2022-10-31 ENCOUNTER — TELEPHONE (OUTPATIENT)
Dept: GASTROENTEROLOGY | Facility: CLINIC | Age: 37
End: 2022-10-31
Payer: COMMERCIAL

## 2022-10-31 NOTE — TELEPHONE ENCOUNTER
----- Message from Maine Junior sent at 10/31/2022  3:53 PM CDT -----  Contact: Patient  Patient called to consult with nurse or staff regarding her upcoming procedure. She states she wanted to know if she can take her current medication the day of her colonoscopy and also had questions about what she can do before the procedure. She would like a miracle back and can be reached at [phone]. Thanks/MR

## 2022-11-03 ENCOUNTER — OUTSIDE PLACE OF SERVICE (OUTPATIENT)
Dept: GASTROENTEROLOGY | Facility: CLINIC | Age: 37
End: 2022-11-03

## 2022-11-03 LAB — CRC RECOMMENDATION EXT: NORMAL

## 2022-11-03 PROCEDURE — 45385 COLONOSCOPY W/LESION REMOVAL: CPT | Mod: ,,, | Performed by: INTERNAL MEDICINE

## 2022-11-03 PROCEDURE — 45380 PR COLONOSCOPY,BIOPSY: ICD-10-PCS | Mod: 59,,, | Performed by: INTERNAL MEDICINE

## 2022-11-03 PROCEDURE — 43239 PR EGD, FLEX, W/BIOPSY, SGL/MULTI: ICD-10-PCS | Mod: 51,,, | Performed by: INTERNAL MEDICINE

## 2022-11-03 PROCEDURE — 45380 COLONOSCOPY AND BIOPSY: CPT | Mod: 59,,, | Performed by: INTERNAL MEDICINE

## 2022-11-03 PROCEDURE — 45385 PR COLONOSCOPY,REMV LESN,SNARE: ICD-10-PCS | Mod: ,,, | Performed by: INTERNAL MEDICINE

## 2022-11-03 PROCEDURE — 43239 EGD BIOPSY SINGLE/MULTIPLE: CPT | Mod: 51,,, | Performed by: INTERNAL MEDICINE

## 2022-11-09 ENCOUNTER — TELEPHONE (OUTPATIENT)
Dept: GASTROENTEROLOGY | Facility: CLINIC | Age: 37
End: 2022-11-09
Payer: COMMERCIAL

## 2022-11-09 DIAGNOSIS — K58.0 IRRITABLE BOWEL SYNDROME WITH DIARRHEA: Primary | ICD-10-CM

## 2022-11-09 NOTE — TELEPHONE ENCOUNTER
DBx nl, GBx react w/o Hp, CBx nl, 2 hyp (one AC), distal rectal Bx (prolapse v procitis) hyp, repeat colonoscopy in 5 years.   Notify patient. No signs of infection, precancerous cells or Celiac disease on the upper endoscopy biopsies.  The colon Bx were normal (no colitis). The colon polyps were benign. Repeat colonoscopy in 5 years.  Update in Health Maintenance section of Epic.  Rec course of Xifaxan for IBS-D therapy. If not covered by her Rx plan, then she should let me know. Have her send me an update in one month.  NBP

## 2022-11-09 NOTE — LETTER
February 1, 2023    Mary Jane Gary  [address]  [place] [address]             [facility]  [address] [address]  Phone: [phone]  Fax: [phone] Dear Ms. Gary:    Our office has made multiple attempts to reach you by phone. Your healthcare is important to us. Please contact us at your earliest convenience at [phone].      Sincerely,        Sakina Yan MD

## 2022-11-10 NOTE — TELEPHONE ENCOUNTER
I just checked about PA and it says that the drug is covered by current benefit plan. No PA is needed. Pt has been given website info to get a coupon from and will notify us if she still has not got medication.

## 2022-11-29 DIAGNOSIS — F41.9 ANXIETY: ICD-10-CM

## 2022-11-29 RX ORDER — CLORAZEPATE DIPOTASSIUM 7.5 MG/1
7.5 TABLET ORAL 2 TIMES DAILY
Qty: 28 TABLET | Refills: 1 | Status: SHIPPED | OUTPATIENT
Start: 2022-11-29 | End: 2023-01-05 | Stop reason: SDUPTHER

## 2022-11-29 NOTE — TELEPHONE ENCOUNTER
----- Message from Sarita Artis sent at 11/29/2022  1:15 PM CST -----  Regarding: Refill  Contact: patient  Type:  RX Refill Request    Who Called: Mary Jane  Refill or New Rx: refill   RX Name and Strength: clorazepate (TRANXENE) 7.5 MG Tab  How is the patient currently taking it? (ex. 1XDay):daily  Is this a 30 day or 90 day RX:14 day  Preferred Pharmacy with phone number:     [facility] [address]  Phone: [phone] Fax: [phone]      Local or Mail Order:local   Ordering Provider: Dr Jalen Johns   Would the patient rather a call back or a response via [facility]?  [email or web address]  Best Call Back Number: [phone] (home) [phone] (work)    Additional Information:     HARDEEP Fletcher

## 2022-12-20 NOTE — TELEPHONE ENCOUNTER
I called pt and she states that she never went to the website to check for savings plan, but states that she is gong to do that soon. Will check back with her next month on response.

## 2023-01-04 NOTE — TELEPHONE ENCOUNTER
----- Message from Nu Raymond LPN sent at 1/3/2023  4:23 PM CST -----  Contact: self    ----- Message -----  From: Darcy Houston  Sent: 1/3/2023   4:15 PM CST  To: Yuri[facility] Staff    Patient is calling in regards to medication IBS..Please call her back at [phone]

## 2023-01-04 NOTE — TELEPHONE ENCOUNTER
Pt's called stating that her pharmacy would need to order Xifaxan and they did not want to fill only 42 tablets.  Pt now has coupon from website and needs new rx sent to [facility] [address].

## 2023-01-05 DIAGNOSIS — F41.9 ANXIETY: ICD-10-CM

## 2023-01-05 RX ORDER — CLORAZEPATE DIPOTASSIUM 7.5 MG/1
7.5 TABLET ORAL 2 TIMES DAILY
Qty: 28 TABLET | Refills: 1 | Status: SHIPPED | OUTPATIENT
Start: 2023-01-05 | End: 2023-02-13 | Stop reason: SDUPTHER

## 2023-01-05 NOTE — TELEPHONE ENCOUNTER
----- Message from Chayo Arriaga sent at 1/5/2023 11:55 AM CST -----  Regarding: med refill  Contact: pt  Type:  RX Refill Request    Who Called:  Mary Jane Gary   Refill or New Rx: refill   RX Name and Strength: clorazepate (TRANXENE) 7.5 MG Tab  How is the patient currently taking it? (ex. 1XDay): 2x day as needed   Is this a 30 day or 90 day RX: 14 day   Preferred Pharmacy with phone number:     "[facility] [address] [address]  Phone: [phone] Fax: [phone]    Local or Mail Order:  local   Ordering Provider: Andreas   Would the patient rather a call back or a response via [facility]?  Call back   Best Call Back Number: [phone]  Additional Information:

## 2023-01-09 ENCOUNTER — DOCUMENTATION ONLY (OUTPATIENT)
Dept: GASTROENTEROLOGY | Facility: CLINIC | Age: 38
End: 2023-01-09
Payer: COMMERCIAL

## 2023-02-06 ENCOUNTER — TELEPHONE (OUTPATIENT)
Dept: GASTROENTEROLOGY | Facility: CLINIC | Age: 38
End: 2023-02-06
Payer: COMMERCIAL

## 2023-02-06 NOTE — TELEPHONE ENCOUNTER
----- Message from Cyn Bah sent at 2/6/2023  2:46 PM CST -----  Pt is returning nurse call. Pt can be reached at [phone]

## 2023-02-06 NOTE — TELEPHONE ENCOUNTER
Patient finally was able to take the prescribed medication, cramping is a little less and she does not have to sit as long before she goes. So far she is happy with the results and will let us know if there are any problems.

## 2023-02-13 DIAGNOSIS — F41.9 ANXIETY: ICD-10-CM

## 2023-02-13 RX ORDER — CLORAZEPATE DIPOTASSIUM 7.5 MG/1
7.5 TABLET ORAL 2 TIMES DAILY
Qty: 28 TABLET | Refills: 1 | Status: SHIPPED | OUTPATIENT
Start: 2023-02-13 | End: 2023-03-22 | Stop reason: SDUPTHER

## 2023-02-13 NOTE — TELEPHONE ENCOUNTER
----- Message from Brandi Bah sent at 2/13/2023  1:34 PM CST -----  Contact: Mary Jane  Type:  RX Refill Request    Who Called:  Mary Jane  Refill or New Rx: Refill   RX Name and Strength: Clorazepate (TRANXENE) 7.5 MG Tab  How is the patient currently taking it? (ex. 1XDay):2xday as needed  Is this a 30 day or 90 day RX: 30  Preferred Pharmacy with phone number:   [facility] [address] [address]  Phone: [phone] Fax: [phone]  Local or Mail Order: Local  Ordering Provider: Dr. Johns  Would the patient rather a call back or a response via [facility]? Call back   Best Call Back Number: Please call her at [phone]  Additional Information:

## 2023-02-26 NOTE — TELEPHONE ENCOUNTER
----- Message from Sue Toledo sent at 4/19/2022  3:15 PM CDT -----  .Type:  RX Refill Request    Who Called: SELF  Refill or New Rx:REFILL  RX Name and Strength: clorazepate (TRANXENE) 7.5 MG Tab  How is the patient currently taking it? (ex. 1XDay):1 TAB 2/DAY  Is this a 30 day or 90 day RX: 90  Preferred Pharmacy with phone number:.  [facility] [address]  Phone: [phone] Fax: [phone]      Local or Mail Order:CALL  Ordering Provider: FATUMA  Would the patient rather a call back or a response via [facility]? CALL  Best Call Back Number:.[phone] (home)   Additional Information:         Rest  Drink plenty of clear fluids   Nasal saline spray to clear nasal drainage and help with nasal congestion  Zyrtec or Claritin to help dry mucus and post nasal drip  Mucinex or Mucinex DM for cough and chest congestion  Tylenol or Ibuprofen for fever, headache and body aches  Warm salt water gargles for throat comfort  Chloraseptic spray or lozenges for throat comfort  RTC with no improvement or worsening    Your test was POSITIVE for COVID-19 (coronavirus).       Please isolate yourself at home.  You may leave home and/or return to work once the following conditions are met:    If you were not hospitalized and are not moderately to severely immunocompromised:   More than 5 days since symptoms first appeared AND  More than 24 hours fever free without medications AND  Symptoms are improving  Continue to wear a mask around others for 5 additional days.    If you were hospitalized OR are moderately to severely immunocompromised:  More than 20 days since symptoms first appeared  More than 24 hours fever free without medications  Symptoms have improved    If you had no symptoms but tested positive:  More than 5 days since the date of the first positive test (20 days if moderately to severely immunocompromised). If you develop symptoms, then use the guidelines above.  Continue to wear a mask around others for 5 additional days.      Contact Tracing    As one of the next steps, you will receive a call or text from the [place] Department of Health ([facility] COVID-19 Tracing Team. See the contact information below so you know not to ignore the health departments call. It is important that you contact them back immediately so they can help.      Contact Tracer Number:  [phone]  Caller ID for most carriers: [place] Dept Health     What is contact tracing?  Contact tracing is a process that helps identify everyone who has been in close contact with an infected person. Contact tracers let those people know they may have  been exposed and guide them on next steps. Confidentiality is important for everyone; no one will be told who may have exposed them to the virus.  Your involvement is important. The more we know about where and how this virus is spreading, the better chance we have at stopping it from spreading further.  What does exposure mean?  Exposure means you have been within 6 feet for more than 15 minutes with a person who has or had COVID-19.  What kind of questions do the contact tracers ask?  A contact tracer will confirm your basic contact information including name, address, phone number, and next of kin, as well as asking about any symptoms you may have had. Theyll also ask you how you think you may have gotten sick, such as places where you may have been exposed to the virus, and people you were with. Those names will never be shared with anyone outside of that call, and will only be used to help trace and stop the spread of the virus.   I have privacy concerns. How will the state use my information?  Your privacy about your health is important. All calls are completed using call centers that use the appropriate health privacy protection measures (HIPAA compliance), meaning that your patient information is safe. No one will ever ask you any questions related to immigration status. Your health comes first.   Do I have to participate?  You do not have to participate, but we strongly encourage you to. Contact tracing can help us catch and control new outbreaks as theyre developing to keep your friends and family safe.   What if I dont hear from anyone?  If you dont receive a call within 24 hours, you can call the number above right away to inquire about your status. That line is open from 8:00 am - 8:00 p.m., 7 days a week.  Contact tracing saves lives! Together, we have the power to beat this virus and keep our loved ones and neighbors safe.    For more information see CDC link below.       https://www.cdc.gov/coronavirus/2019-ncov/hcp/guidance-prevent-spread.html#precautions        Sources:  [facility], [place] Department [facility] and [facility]     Regarding DEHYDRATION, advised patient to call 911 if they should experience confusion, dizziness, lethargy, and/or lightheadedness.  The patient was instructed to contact their primary care provider immediately or return to the ED for any of the following symptoms: blood in the stool or vomit; diarrhea or vomiting for an extended period of time (vomiting > 24 hours or diarrhea for > 3 days); dry mouth or dry eyes; poor skin turgor; listlessness and inactiveness; tachycardia; little or no urine output for 8 hours; inability to keep fluids down; and sunken eyes.  The patient was encouraged to drink plenty of water daily and to increase water intake when weather is hot or during exercise.

## 2023-03-20 ENCOUNTER — TELEPHONE (OUTPATIENT)
Dept: OBSTETRICS AND GYNECOLOGY | Facility: CLINIC | Age: 38
End: 2023-03-20
Payer: COMMERCIAL

## 2023-03-20 NOTE — TELEPHONE ENCOUNTER
----- Message from Maine Pacheco sent at 3/20/2023  9:45 AM CDT -----  Contact: Patient  Type:  RX Refill Request    Who Called: Mary Jane Gary  Refill or New Rx:refill  RX Name and Strength:clorazepate (TRANXENE) 7.5 MG Tab  How is the patient currently taking it? (ex. 1XDay):1 tablet 2x times a day   Is this a 30 day or 90 day RX:30  Preferred Pharmacy with phone number:  [facility] [address] [address]  Phone: [phone] Fax: [phone]  Local or Mail Order:local   Ordering Provider:Dr Johns   Would the patient rather a call back or a response via [facility]? Call back   Best Call Back Number:[phone]  Additional Information:

## 2023-03-23 ENCOUNTER — PATIENT MESSAGE (OUTPATIENT)
Dept: OBSTETRICS AND GYNECOLOGY | Facility: CLINIC | Age: 38
End: 2023-03-23
Payer: COMMERCIAL

## 2023-04-05 ENCOUNTER — PATIENT MESSAGE (OUTPATIENT)
Dept: OBSTETRICS AND GYNECOLOGY | Facility: CLINIC | Age: 38
End: 2023-04-05
Payer: COMMERCIAL

## 2023-05-05 DIAGNOSIS — F41.9 ANXIETY: ICD-10-CM

## 2023-05-05 RX ORDER — CLORAZEPATE DIPOTASSIUM 7.5 MG/1
7.5 TABLET ORAL 2 TIMES DAILY
Qty: 28 TABLET | Refills: 0 | Status: SHIPPED | OUTPATIENT
Start: 2023-05-05 | End: 2023-05-17 | Stop reason: SDUPTHER

## 2023-05-05 NOTE — TELEPHONE ENCOUNTER
----- Message from Sarita Artis sent at 5/5/2023  9:03 AM CDT -----  Regarding: Refill  Contact: patient  Type:  RX Refill Request    Who Called: Mary Jane  Refill or New Rx:refill   RX Name and Strength: clorazepate (TRANXENE) 7.5 MG Tab  How is the patient currently taking it? (ex. 1XDay):daily  Is this a 30 day or 90 day RX: 30  Preferred Pharmacy with phone number:   [facility] [address]  Phone: [phone] Fax: [phone]      Local or Mail Order:local   Ordering Provider:Dr Jalen Johns   Would the patient rather a call back or a response via [facility]?  Call back   Best Call Back Number: [phone] (home)   Additional Information:       HARDEEP Fletcher

## 2023-05-11 ENCOUNTER — TELEPHONE (OUTPATIENT)
Dept: OBSTETRICS AND GYNECOLOGY | Facility: CLINIC | Age: 38
End: 2023-05-11
Payer: COMMERCIAL

## 2023-05-11 DIAGNOSIS — N92.6 IRREGULAR PERIODS/MENSTRUAL CYCLES: Primary | ICD-10-CM

## 2023-05-11 NOTE — TELEPHONE ENCOUNTER
Patient states that cycles are usually regular. She has noticed since March that her cycles have gotten later and later. She is also having significant PMS symptoms. She is wanting to have lab work done. Printed for Dr. Johns to review.

## 2023-05-15 NOTE — TELEPHONE ENCOUNTER
Order labs per v/o Dr. Johns: TSH, FREE T4, FSH, LH.    Called and left vm for patient to let her know that I am sending the lab order and she does not have to be fasting.

## 2023-05-17 ENCOUNTER — PATIENT MESSAGE (OUTPATIENT)
Dept: OBSTETRICS AND GYNECOLOGY | Facility: CLINIC | Age: 38
End: 2023-05-17
Payer: COMMERCIAL

## 2023-05-17 DIAGNOSIS — F41.9 ANXIETY: ICD-10-CM

## 2023-05-17 RX ORDER — CLORAZEPATE DIPOTASSIUM 7.5 MG/1
7.5 TABLET ORAL 2 TIMES DAILY PRN
Qty: 28 TABLET | Refills: 1 | Status: SHIPPED | OUTPATIENT
Start: 2023-05-17 | End: 2023-06-16 | Stop reason: SDUPTHER

## 2023-06-06 LAB
FSH: 2.29 MIU/ML
LH: 9.22 MIU/ML
T4, FREE: 0.85 NG/DL (ref 0.93–1.7)
TSH SERPL DL<=0.005 MIU/L-ACNC: 1.35 UIU/ML (ref 0.27–4.2)

## 2023-06-07 ENCOUNTER — TELEPHONE (OUTPATIENT)
Dept: OBSTETRICS AND GYNECOLOGY | Facility: CLINIC | Age: 38
End: 2023-06-07
Payer: COMMERCIAL

## 2023-06-07 DIAGNOSIS — E03.9 HYPOTHYROIDISM, UNSPECIFIED TYPE: Primary | ICD-10-CM

## 2023-06-07 NOTE — TELEPHONE ENCOUNTER
----- Message from Jalen Johns III, MD sent at 6/6/2023  7:37 PM CDT -----  Please call  she is not in menopause   her thyroid is ok but I would re check in 2 months to make sure she is not developing hypothyroidism

## 2023-06-16 DIAGNOSIS — F41.9 ANXIETY: ICD-10-CM

## 2023-06-16 RX ORDER — CLORAZEPATE DIPOTASSIUM 7.5 MG/1
7.5 TABLET ORAL 2 TIMES DAILY PRN
Qty: 28 TABLET | Refills: 1 | Status: SHIPPED | OUTPATIENT
Start: 2023-06-16 | End: 2023-07-18 | Stop reason: SDUPTHER

## 2023-06-20 ENCOUNTER — TELEPHONE (OUTPATIENT)
Dept: GASTROENTEROLOGY | Facility: CLINIC | Age: 38
End: 2023-06-20

## 2023-06-20 NOTE — TELEPHONE ENCOUNTER
"Mary Jane called stating she saw  the [facility] report  on hospitals regarding IBS . The article mentions a blood test that  will soon be available called the " in-foods" test , it helps to identify foods that trigger IBS flare ups. She said the test will require a physicians order , it is not available around here as of yet but hopefully it will be soon.   She was wondering if maybe you can give her an order to have this certain testing completed? She has been doing the food elimination diet although this certain blood test can test multiple food groups.   "

## 2023-06-20 NOTE — TELEPHONE ENCOUNTER
----- Message from Windy Guy sent at 6/20/2023 11:27 AM CDT -----  Contact: self  Type: Staff Message    Caller: Thaisdenver Abdirahman    Call Back Number: [phone]    Nature of the Call: Patient is requesting a blood test for IBS called [facility]. Please call patient back as soon as possible  Additional Information: n/a

## 2023-06-20 NOTE — TELEPHONE ENCOUNTER
We should be able to send out the order for the blood test once it becomes available. As of now, it looks like it has not been approved by the FDA and we are unable to order i. Low FODMAP diet is useful in identifying triggers for IBS. She has also had a course of xifaxan in the past. If she is having symptoms we can send out another course of this. Let her know that some patients may need more than one course of xifaxin for a reduction in symptoms  KN.

## 2023-07-18 DIAGNOSIS — F41.9 ANXIETY: ICD-10-CM

## 2023-07-19 RX ORDER — CLORAZEPATE DIPOTASSIUM 7.5 MG/1
7.5 TABLET ORAL 2 TIMES DAILY PRN
Qty: 28 TABLET | Refills: 1 | Status: SHIPPED | OUTPATIENT
Start: 2023-07-19 | End: 2023-08-15 | Stop reason: SDUPTHER

## 2023-07-19 NOTE — TELEPHONE ENCOUNTER
----- Message from Windy Brooks sent at 7/19/2023  3:05 PM CDT -----  Contact: self  Type:  RX Refill Request  Who Called: Mary Jane Gary  Refill or New Rx:refill  RX Name and Strength:clorazepate (TRANXENE) 7.5 MG Tab 28 tablet 1 6/16/2023 7/16/2023  Sig: Take 1 tablet (7.5 mg total) by mouth 2 (two) times daily as needed.  Route: Oral  How is the patient currently taking it? (ex. 1XDay):2x's daily  Is this a 30 day or 90 day RX:28 tablets  Preferred Pharmacy with phone number:  [facility] [address] [address]  Phone: [phone] Fax: [phone]  Local or Mail Order:local  Ordering Provider:Jalen Johns  Would the patient rather a call back or a response via [facility]? call  Best Call Back Number:[phone]  Additional Information: na

## 2023-08-14 ENCOUNTER — PATIENT MESSAGE (OUTPATIENT)
Dept: OBSTETRICS AND GYNECOLOGY | Facility: CLINIC | Age: 38
End: 2023-08-14
Payer: COMMERCIAL

## 2023-08-15 ENCOUNTER — OFFICE VISIT (OUTPATIENT)
Dept: OBSTETRICS AND GYNECOLOGY | Facility: CLINIC | Age: 38
End: 2023-08-15
Payer: COMMERCIAL

## 2023-08-15 VITALS
HEART RATE: 98 BPM | HEIGHT: 67 IN | WEIGHT: 153 LBS | BODY MASS INDEX: 24.01 KG/M2 | DIASTOLIC BLOOD PRESSURE: 100 MMHG | SYSTOLIC BLOOD PRESSURE: 146 MMHG

## 2023-08-15 DIAGNOSIS — N92.0 MENORRHAGIA WITH REGULAR CYCLE: Primary | ICD-10-CM

## 2023-08-15 DIAGNOSIS — Z01.419 WELL WOMAN EXAM WITH ROUTINE GYNECOLOGICAL EXAM: ICD-10-CM

## 2023-08-15 DIAGNOSIS — F41.9 ANXIETY: ICD-10-CM

## 2023-08-15 PROCEDURE — 3008F BODY MASS INDEX DOCD: CPT | Mod: CPTII,S$GLB,, | Performed by: OBSTETRICS & GYNECOLOGY

## 2023-08-15 PROCEDURE — 3077F PR MOST RECENT SYSTOLIC BLOOD PRESSURE >= 140 MM HG: ICD-10-PCS | Mod: CPTII,S$GLB,, | Performed by: OBSTETRICS & GYNECOLOGY

## 2023-08-15 PROCEDURE — 3077F SYST BP >= 140 MM HG: CPT | Mod: CPTII,S$GLB,, | Performed by: OBSTETRICS & GYNECOLOGY

## 2023-08-15 PROCEDURE — 3080F PR MOST RECENT DIASTOLIC BLOOD PRESSURE >= 90 MM HG: ICD-10-PCS | Mod: CPTII,S$GLB,, | Performed by: OBSTETRICS & GYNECOLOGY

## 2023-08-15 PROCEDURE — 99395 PR PREVENTIVE VISIT,EST,18-39: ICD-10-PCS | Mod: S$GLB,,, | Performed by: OBSTETRICS & GYNECOLOGY

## 2023-08-15 PROCEDURE — 3008F PR BODY MASS INDEX (BMI) DOCUMENTED: ICD-10-PCS | Mod: CPTII,S$GLB,, | Performed by: OBSTETRICS & GYNECOLOGY

## 2023-08-15 PROCEDURE — 99395 PREV VISIT EST AGE 18-39: CPT | Mod: S$GLB,,, | Performed by: OBSTETRICS & GYNECOLOGY

## 2023-08-15 PROCEDURE — 3080F DIAST BP >= 90 MM HG: CPT | Mod: CPTII,S$GLB,, | Performed by: OBSTETRICS & GYNECOLOGY

## 2023-08-15 RX ORDER — CLORAZEPATE DIPOTASSIUM 7.5 MG/1
7.5 TABLET ORAL 2 TIMES DAILY PRN
Qty: 28 TABLET | Refills: 1 | Status: SHIPPED | OUTPATIENT
Start: 2023-08-15 | End: 2023-09-15 | Stop reason: SDUPTHER

## 2023-08-15 RX ORDER — FLUOXETINE 10 MG/1
CAPSULE ORAL
COMMUNITY
Start: 2023-08-10 | End: 2023-08-15 | Stop reason: SDUPTHER

## 2023-08-15 RX ORDER — FLUOXETINE 10 MG/1
10 CAPSULE ORAL DAILY
Qty: 90 CAPSULE | Refills: 3 | Status: SHIPPED | OUTPATIENT
Start: 2023-08-15

## 2023-08-15 RX ORDER — HYOSCYAMINE SULFATE 0.125 MG
TABLET ORAL
COMMUNITY
Start: 2023-04-13

## 2023-08-15 NOTE — PROGRESS NOTES
Subjective:       Patient ID: Mary Jane Gary is a 38 y.o. female.    Chief Complaint:  Well Woman      History of Present Illness  She is doing well.  No new health issues,  No abnormal bleeding, No GI or Gu concerns,  No dyspareunia  She is having reg cycles 5 weeks apart.  Vasectomy . She had some hormone checks.  She did get her thyroid   PMS is becoming a concern for her    she started the Prozac and felt better.   She finds her sx are shorter.  She has some cramps before her cycle she has clots with her cycles   HPI      GYN & OB History  Patient's last menstrual period was 2023 (exact date).     Date of Last Pap: No result found    OB History    Para Term  AB Living   3 3 3         SAB IAB Ectopic Multiple Live Births                  # Outcome Date GA Lbr Jonathan/2nd Weight Sex Delivery Anes PTL Lv   3 Term 19     Vag-Spont      2 Term 16 39w0d   M Vag-Spont      1 Term 06 38w0d   M Vag-Spont          Review of Systems  Review of Systems   Constitutional:  Negative for activity change.   Eyes:  Negative for visual disturbance.   Respiratory:  Negative for shortness of breath.    Cardiovascular:  Negative for chest pain.   Gastrointestinal:  Negative for abdominal pain.   Genitourinary:  Negative for vaginal bleeding.        No abnormal vaginal bleeding   Musculoskeletal:  Negative for back pain.   Integumentary:  Negative for rash and breast mass.   Neurological:  Negative for numbness.   Psychiatric/Behavioral:          No mood disturbance or changes    Breast: Negative for mass            Objective:    Physical Exam:   Constitutional: She is oriented to person, place, and time. She appears well-developed. She is cooperative.    HENT:   Head: Normocephalic.     Neck: Trachea normal. No thyromegaly present.    Cardiovascular:  Normal rate, regular rhythm and normal heart sounds.             Pulmonary/Chest: Effort normal and breath sounds normal. Right breast exhibits no  mass, no nipple discharge and no skin change. Left breast exhibits no mass, no nipple discharge and no skin change.        Abdominal: Soft. There is no abdominal tenderness. There is no rebound and no guarding.     Genitourinary:    Vagina and uterus normal.      Pelvic exam was performed with patient supine.   Labial bartholins normal.There is no lesion on the right labia. There is no lesion on the left labia. Cervix is normal. Right adnexum displays no mass and no tenderness. Left adnexum displays no mass and no tenderness. Cervix exhibits no discharge. Uterus is not enlarged and not tender.              Lymphadenopathy:        Head (right side): No submental and no submandibular adenopathy present.        Head (left side): No submental and no submandibular adenopathy present.     She has no cervical adenopathy.    Neurological: She is alert and oriented to person, place, and time.    Skin: Skin is warm.    Psychiatric: She has a normal mood and affect. Her speech is normal and behavior is normal. Thought content normal.          Assessment:        1. Menorrhagia with regular cycle    2. Well woman exam with routine gynecological exam                 Plan:             Pap  not done    she did have a colonoscopy and had two polyps and neg   discussed will return for her annual     Pt is aware we call all results. If she does not hear from our office regarding her result within a week of having a study or procedure performed she is to call the office so that we can research the result for her.  Birth control discussed  Chaperone was present

## 2023-09-15 DIAGNOSIS — F41.9 ANXIETY: ICD-10-CM

## 2023-09-15 NOTE — TELEPHONE ENCOUNTER
----- Message from Deidra Russ sent at 9/15/2023 11:14 AM CDT -----  Contact: self  Pt is calling for refill for Clorazepatte sent to   [facility] - [address] [address]  Phone: [phone] Fax: [phone]   Please call pt at [phone]

## 2023-09-18 RX ORDER — CLORAZEPATE DIPOTASSIUM 7.5 MG/1
7.5 TABLET ORAL 2 TIMES DAILY PRN
Qty: 28 TABLET | Refills: 1 | Status: SHIPPED | OUTPATIENT
Start: 2023-09-18 | End: 2023-10-30 | Stop reason: SDUPTHER

## 2023-10-30 DIAGNOSIS — F41.9 ANXIETY: ICD-10-CM

## 2023-10-30 RX ORDER — CLORAZEPATE DIPOTASSIUM 7.5 MG/1
7.5 TABLET ORAL 2 TIMES DAILY PRN
Qty: 28 TABLET | Refills: 1 | Status: SHIPPED | OUTPATIENT
Start: 2023-10-30 | End: 2023-11-30 | Stop reason: SDUPTHER

## 2023-10-30 NOTE — TELEPHONE ENCOUNTER
----- Message from Jigna Bah sent at 10/30/2023  2:53 PM CDT -----  Contact: LADONNA CEDEÑO [[id number]]  .Type:  RX Refill Request    Who Called: LADONNA CEDEÑO [[id number]]  Refill or New Rx: refill  RX Name and Strength:  - clorazepate (TRANXENE) 7.5 MG Tab  How is the patient currently taking it? (ex. 1XDay): as prescribed  Is this a 30 day or 90 day RX:   Preferred Pharmacy with phone number:   [facility] [address]  Phone: [phone] Fax: [phone]  Local or Mail Order: local  Ordering Provider: Andreas  Would the patient rather a call back or a response via [facility]?  Call  Best Call Back Number: .[phone]   Additional Information:

## 2023-11-06 ENCOUNTER — TELEPHONE (OUTPATIENT)
Dept: GASTROENTEROLOGY | Facility: CLINIC | Age: 38
End: 2023-11-06

## 2023-11-06 NOTE — TELEPHONE ENCOUNTER
Pt called to report that she is currently recovering from the FLU. She reports that she has been doing really well GI related for the past couple of months. Pt requested to reschedule to next available OV and will call us with any issues before then.

## 2023-11-06 NOTE — TELEPHONE ENCOUNTER
----- Message from Nathalie Rosales sent at 11/6/2023  8:20 AM CST -----  Contact: self  Requesting a call back regarding her appt tomorrow at 200pm - she has the flu and needs to know if she should reschedule. She hasn't had a fever for today, she's on day 4 or 5... Please call back at [phone]

## 2023-11-30 DIAGNOSIS — F41.9 ANXIETY: ICD-10-CM

## 2023-11-30 RX ORDER — CLORAZEPATE DIPOTASSIUM 7.5 MG/1
7.5 TABLET ORAL 2 TIMES DAILY PRN
Qty: 28 TABLET | Refills: 1 | Status: SHIPPED | OUTPATIENT
Start: 2023-11-30 | End: 2024-01-04 | Stop reason: SDUPTHER

## 2023-11-30 NOTE — TELEPHONE ENCOUNTER
----- Message from Windy Brooks sent at 11/30/2023  3:20 PM CST -----  Contact: self  Type:  RX Refill Request    Who Called: Mary Jane Gary  Refill or New Rx:refill  RX Name and Strength:clorazepate (TRANXENE) 7.5 MG Tab 28 tablet 1 10/30/2023 11/29/2023  Sig: Take 1 tablet (7.5 mg total) by mouth 2 (two) times daily as needed. Route: Oral  How is the patient currently taking it? (ex. 1XDay):As needed   Is this a 30 day or 90 day RX:28  Preferred Pharmacy with phone number:  [facility] [address] [address]  Phone: [phone] Fax: [phone]  Local or Mail Order:Dr Jalen Johns  Ordering Provider:local  Would the patient rather a call back or a response via [facility]? call  Best Call Back Number:[phone]  Additional Information: na

## 2024-01-04 DIAGNOSIS — F41.9 ANXIETY: ICD-10-CM

## 2024-01-04 RX ORDER — CLORAZEPATE DIPOTASSIUM 7.5 MG/1
7.5 TABLET ORAL 2 TIMES DAILY PRN
Qty: 28 TABLET | Refills: 1 | Status: SHIPPED | OUTPATIENT
Start: 2024-01-04 | End: 2024-02-05 | Stop reason: SDUPTHER

## 2024-01-04 NOTE — TELEPHONE ENCOUNTER
----- Message from Windy Brooks sent at 1/4/2024 10:17 AM CST -----  Contact: self  Type:  RX Refill Request  Who Called: Mary Jane Gary  Refill or New Rx:refill  RX Name and Strength:clorazepate (TRANXENE) 7.5 MG Tab 28 tablet 1 11/30/2023 12/30/2023  Sig: Take 1 tablet (7.5 mg total) by mouth 2 (two) times daily as needed. Route: Oral  How is the patient currently taking it? (ex. 1XDay):1 daily  Is this a 30 day or 90 day RX:28  Preferred Pharmacy with phone number:  [facility] [address] [address]  Phone: [phone] Fax: [phone]  Local or Mail Order:local  Ordering Provider:Dr Jalen Johns  Would the patient rather a call back or a response via [facility]? call  Best Call Back Number:[phone]  Additional Information: na

## 2024-02-05 ENCOUNTER — PATIENT MESSAGE (OUTPATIENT)
Dept: OBSTETRICS AND GYNECOLOGY | Facility: CLINIC | Age: 39
End: 2024-02-05
Payer: COMMERCIAL

## 2024-02-05 ENCOUNTER — TELEPHONE (OUTPATIENT)
Dept: OBSTETRICS AND GYNECOLOGY | Facility: CLINIC | Age: 39
End: 2024-02-05
Payer: COMMERCIAL

## 2024-02-05 DIAGNOSIS — R53.83 FATIGUE, UNSPECIFIED TYPE: Primary | ICD-10-CM

## 2024-02-05 DIAGNOSIS — F41.9 ANXIETY: ICD-10-CM

## 2024-02-05 RX ORDER — CLORAZEPATE DIPOTASSIUM 7.5 MG/1
7.5 TABLET ORAL 2 TIMES DAILY PRN
Qty: 28 TABLET | Refills: 1 | Status: SHIPPED | OUTPATIENT
Start: 2024-02-05 | End: 2024-03-26 | Stop reason: SDUPTHER

## 2024-02-05 NOTE — TELEPHONE ENCOUNTER
----- Message from Maine Pacheco sent at 2/5/2024  3:30 PM CST -----  Contact: Patient  Type:  RX Refill Request    Who Called: Mary Jane Gary   Refill or New Rx:refill   RX Name and Strength:clorazepate (TRANXENE) 7.5 MG Tab  How is the patient currently taking it? (ex. 1XDay):Take 1 tablet (7.5 mg total) by mouth 2 (two) times daily as needed  Is this a 30 day or 90 day RX:30  Preferred Pharmacy with phone number:  [facility] [address]  Phone: [phone] Fax: [phone]  Local or Mail Order:local   Ordering Provider:Dr Johns   Would the patient rather a call back or a response via [facility]? Call back   Best Call Back Number:[phone]  Additional Information: Mary Jane states she will be going out of the [place] for work and will be leaving on tomorrow. She would like to have this medication filled. Patient also states she would like to have orders put in for her thyroid function because she is still having issues.

## 2024-03-15 ENCOUNTER — TELEPHONE (OUTPATIENT)
Dept: OBSTETRICS AND GYNECOLOGY | Facility: CLINIC | Age: 39
End: 2024-03-15
Payer: COMMERCIAL

## 2024-03-15 NOTE — TELEPHONE ENCOUNTER
----- Message from Maggy Rodriguez sent at 3/14/2024  4:25 PM CDT -----  Contact: Patient  Patient is calling to request medical records from the early 2000s for an abnormal pap smear, patient is not sure of the date but would like discuss if it possible to retrieve the records via email. Please give a call back at [phone].

## 2024-03-15 NOTE — TELEPHONE ENCOUNTER
Spoke to patient. We do not have records prior to 2020 and all pap smears on file were normal. I checked path lab portal and did not see any h/o abnormal papsmears.

## 2024-03-26 DIAGNOSIS — F41.9 ANXIETY: ICD-10-CM

## 2024-03-26 RX ORDER — CLORAZEPATE DIPOTASSIUM 7.5 MG/1
7.5 TABLET ORAL 2 TIMES DAILY PRN
Qty: 28 TABLET | Refills: 1 | Status: SHIPPED | OUTPATIENT
Start: 2024-03-26 | End: 2024-05-09 | Stop reason: SDUPTHER

## 2024-03-26 NOTE — TELEPHONE ENCOUNTER
----- Message from Maine Pacheco sent at 3/26/2024 10:33 AM CDT -----  Contact: Patient  Type:  RX Refill Request    Who Called: Mary Jane Gary  Refill or New Rx:refill   RX Name and Strength:clorazepate (TRANXENE) 7.5 MG Tab  How is the patient currently taking it? (ex. 1XDay):Take 1 tablet (7.5 mg total) by mouth 2 (two) times daily as needed  Is this a 30 day or 90 day RX:30  Preferred Pharmacy with phone number:  [facility] [address] [address]  Phone: [phone] Fax: [phone]  Local or Mail Order:local   Ordering Provider:Dr Johns   Would the patient rather a call back or a response via [facility]? Call back   Best Call Back Number:[phone]  Additional Information:

## 2024-04-11 ENCOUNTER — TELEPHONE (OUTPATIENT)
Dept: OBSTETRICS AND GYNECOLOGY | Facility: CLINIC | Age: 39
End: 2024-04-11
Payer: COMMERCIAL

## 2024-04-11 NOTE — TELEPHONE ENCOUNTER
----- Message from Maggy Rodriguez sent at 4/11/2024  9:34 AM CDT -----  Contact: PT  Patient is calling to request an ultrasound for bloating from Dr. Johns, please give patient a call back regarding as well an update on medical information to [phone].

## 2024-04-11 NOTE — TELEPHONE ENCOUNTER
Spoke with patient. She wants to do an ultrasound because she is bloated. She did disclose that she is in the midst of IBS and hypothyroidism treatment. She also thinks she may have a gluten allergy. We discussed sign and symptoms of ovarian or uterine problems that would warrant an ultrasound. She verbalized understanding. She is going to update her chart with her new medications and diagnosises. She will contact if she needs anything else from the office.

## 2024-04-23 ENCOUNTER — TELEPHONE (OUTPATIENT)
Dept: OBSTETRICS AND GYNECOLOGY | Facility: CLINIC | Age: 39
End: 2024-04-23
Payer: COMMERCIAL

## 2024-04-23 DIAGNOSIS — R10.2 PELVIC PAIN: Primary | ICD-10-CM

## 2024-04-23 NOTE — TELEPHONE ENCOUNTER
Patient text me on my cell phone   She has called the office trying to get a pelvic ultrasound and appointment with Dr. Johns  She wants a Hysterectomy   Gyn problem visit and Pelvic Ultrasound scheduled for patient.

## 2024-04-30 ENCOUNTER — PROCEDURE VISIT (OUTPATIENT)
Dept: OBSTETRICS AND GYNECOLOGY | Facility: CLINIC | Age: 39
End: 2024-04-30
Payer: COMMERCIAL

## 2024-04-30 DIAGNOSIS — R10.2 PELVIC PAIN: ICD-10-CM

## 2024-04-30 PROCEDURE — 76830 TRANSVAGINAL US NON-OB: CPT | Mod: S$GLB,,, | Performed by: OBSTETRICS & GYNECOLOGY

## 2024-05-01 DIAGNOSIS — R93.89 THICKENED ENDOMETRIUM: Primary | ICD-10-CM

## 2024-05-03 ENCOUNTER — TELEPHONE (OUTPATIENT)
Dept: OBSTETRICS AND GYNECOLOGY | Facility: CLINIC | Age: 39
End: 2024-05-03
Payer: COMMERCIAL

## 2024-05-03 NOTE — TELEPHONE ENCOUNTER
----- Message from Windy Guy sent at 5/3/2024  9:12 AM CDT -----  Contact: self  Type: Staff Message    Caller: Mary Jane Gary  Call Back Number: [phone]  Nature of the Call: pt wanting to schedule an Hysteroscopic as soon as possible  Additional Information: na

## 2024-05-09 DIAGNOSIS — F41.9 ANXIETY: ICD-10-CM

## 2024-05-09 RX ORDER — CLORAZEPATE DIPOTASSIUM 7.5 MG/1
7.5 TABLET ORAL 2 TIMES DAILY PRN
Qty: 28 TABLET | Refills: 1 | Status: SHIPPED | OUTPATIENT
Start: 2024-05-09 | End: 2024-06-08

## 2024-05-09 NOTE — TELEPHONE ENCOUNTER
----- Message from Becca Rodriguez sent at 5/9/2024 10:57 AM CDT -----  Contact: self  Type:  RX Refill Request    Who Called: Mary Jane Gary  Refill or New Rx:refill  RX Name and Strength:clorazepate (TRANXENE) 7.5 MG Tab  How is the patient currently taking it? (ex. 1XDay):daily  Is this a 30 day or 90 day RX:30  Preferred Pharmacy with phone number:  [facility] [address] [address]  Phone: [phone] Fax: [phone]    Local or Mail Order:local  Ordering Provider:urvashi mccollum  Would the patient rather a call back or a response via [facility]? sena  Best Call Back Number:[phone]  Additional Information: n/a

## 2024-06-10 ENCOUNTER — TELEPHONE (OUTPATIENT)
Dept: OBSTETRICS AND GYNECOLOGY | Facility: CLINIC | Age: 39
End: 2024-06-10
Payer: COMMERCIAL

## 2024-06-10 NOTE — TELEPHONE ENCOUNTER
----- Message from Tanna Welsh sent at 6/10/2024 12:06 PM CDT -----  Type:  Needs Medical Advice    Who Called: Mary Jane Gary  Symptoms (please be specific): -   How long has patient had these symptoms:  -  Pharmacy name and phone #:  -  Would the patient rather a call back or a response via [facility]?    Best Call Back Number: [phone]    Additional Information: pt needs to speak w/ nurse wants to know if she has labs to do for preop visit on tomorrow, please call

## 2024-06-10 NOTE — TELEPHONE ENCOUNTER
Spoke to patient advised that she will have to go to the hospital after pre op visit for labs. Patient verbalized understanding.

## 2024-06-11 ENCOUNTER — OFFICE VISIT (OUTPATIENT)
Dept: OBSTETRICS AND GYNECOLOGY | Facility: CLINIC | Age: 39
End: 2024-06-11
Payer: COMMERCIAL

## 2024-06-11 VITALS
SYSTOLIC BLOOD PRESSURE: 141 MMHG | HEART RATE: 81 BPM | WEIGHT: 157 LBS | DIASTOLIC BLOOD PRESSURE: 98 MMHG | BODY MASS INDEX: 24.64 KG/M2 | HEIGHT: 67 IN

## 2024-06-11 DIAGNOSIS — N84.0 ENDOMETRIAL POLYP: Primary | ICD-10-CM

## 2024-06-11 LAB
APPEARANCE, UA: CLEAR
BACTERIA SPEC CULT: ABNORMAL /HPF
BASOPHILS NFR BLD: 0.9 % (ref 0–3)
BILIRUB UR QL STRIP: NEGATIVE MG/DL
COLOR UR: ABNORMAL
EOSINOPHIL NFR BLD: 0.9 % (ref 1–3)
ERYTHROCYTE [DISTWIDTH] IN BLOOD BY AUTOMATED COUNT: 11.9 % (ref 12.5–18)
GLUCOSE (UA): NORMAL MG/DL
HCT VFR BLD AUTO: 40.9 % (ref 37–47)
HGB BLD-MCNC: 13.8 G/DL (ref 12–16)
HGB UR QL STRIP: 10 /UL
KETONES UR QL STRIP: NEGATIVE MG/DL
LEUKOCYTE ESTERASE UR QL STRIP: NEGATIVE /UL
LYMPHOCYTES NFR BLD: 28 % (ref 25–40)
MACROCYTES BLD QL SMEAR: NORMAL
MCH RBC QN AUTO: 35 PG (ref 27–31.2)
MCHC RBC AUTO-ENTMCNC: 33.7 G/DL (ref 31.8–35.4)
MCV RBC AUTO: 103.8 FL (ref 80–97)
MONOCYTES NFR BLD: 8.6 % (ref 1–15)
NEUTROPHILS # BLD AUTO: 4.1 10*3/UL (ref 1.8–7.7)
NEUTROPHILS NFR BLD: 61.2 % (ref 37–80)
NITRITE UR QL STRIP: NEGATIVE
NUCLEATED RED BLOOD CELLS: 0 %
PH UR STRIP: 6 PH (ref 5–9)
PLATELETS: 251 10*3/UL (ref 142–424)
PROT UR QL STRIP: NEGATIVE MG/DL
RBC # BLD AUTO: 3.94 10*6/UL (ref 4.2–5.4)
RBC #/AREA URNS HPF: ABNORMAL /HPF (ref 0–2)
RPR: NON REACTIVE
SERVICE COMMENT 03: ABNORMAL
SP GR UR STRIP: 1.01 (ref 1–1.03)
SPECIMEN COLLECTION METHOD, URINE: ABNORMAL
SQUAMOUS EPITHELIAL, UA: ABNORMAL /LPF
UROBILINOGEN UR STRIP-ACNC: NORMAL MG/DL
WBC # BLD: 6.7 10*3/UL (ref 4.6–10.2)
WBC #/AREA URNS HPF: ABNORMAL /HPF (ref 0–5)

## 2024-06-11 PROCEDURE — 3008F BODY MASS INDEX DOCD: CPT | Mod: CPTII,S$GLB,, | Performed by: OBSTETRICS & GYNECOLOGY

## 2024-06-11 PROCEDURE — 99213 OFFICE O/P EST LOW 20 MIN: CPT | Mod: S$GLB,,, | Performed by: OBSTETRICS & GYNECOLOGY

## 2024-06-11 PROCEDURE — 3080F DIAST BP >= 90 MM HG: CPT | Mod: CPTII,S$GLB,, | Performed by: OBSTETRICS & GYNECOLOGY

## 2024-06-11 PROCEDURE — 3077F SYST BP >= 140 MM HG: CPT | Mod: CPTII,S$GLB,, | Performed by: OBSTETRICS & GYNECOLOGY

## 2024-06-11 RX ORDER — HYDROCODONE BITARTRATE AND ACETAMINOPHEN 5; 325 MG/1; MG/1
1 TABLET ORAL EVERY 6 HOURS PRN
Qty: 12 TABLET | Refills: 0 | Status: SHIPPED | OUTPATIENT
Start: 2024-06-11

## 2024-06-11 RX ORDER — PROMETHAZINE HYDROCHLORIDE 12.5 MG/1
12.5 TABLET ORAL 4 TIMES DAILY
Qty: 12 TABLET | Refills: 0 | Status: SHIPPED | OUTPATIENT
Start: 2024-06-11

## 2024-06-11 RX ORDER — IBUPROFEN 600 MG/1
600 TABLET ORAL 3 TIMES DAILY
Qty: 18 TABLET | Refills: 1 | Status: SHIPPED | OUTPATIENT
Start: 2024-06-11

## 2024-06-11 RX ORDER — LEVOTHYROXINE SODIUM 50 UG/1
TABLET ORAL
COMMUNITY
Start: 2024-05-30

## 2024-06-11 NOTE — PROGRESS NOTES
Subjective:       Patient ID: Mary Jane Gary is a 39 y.o. female.    Chief Complaint:  Pre-op Exam      History of Present Illness  She is doing well.  She has a heavy first day.  On the ultrasound there is an area that is suggestive of a polyp.   She got the ultrasound secondary to bloating.   She is now off glutin.    She has no dyspareunia and no bladder concerns   .   HPI      GYN & OB History  Patient's last menstrual period was 2024 (approximate).     Date of Last Pap: No result found    OB History    Para Term  AB Living   3 3 3         SAB IAB Ectopic Multiple Live Births                  # Outcome Date GA Lbr Jonathan/2nd Weight Sex Type Anes PTL Lv   3 Term 19     Vag-Spont      2 Term 16 39w0d   M Vag-Spont      1 Term 06 38w0d   M Vag-Spont          Review of Systems  Review of Systems   Unable to perform ROS  Genitourinary:  Positive for menorrhagia.           Objective:    Physical Exam:   Constitutional: She is oriented to person, place, and time. She appears well-developed. She is cooperative.    HENT:   Head: Normocephalic.     Neck: Trachea normal. No thyromegaly present.    Cardiovascular:  Normal rate, regular rhythm and normal heart sounds.             Pulmonary/Chest: Effort normal and breath sounds normal.        Abdominal: Soft. There is no abdominal tenderness. There is no rebound and no guarding.     Genitourinary:    Vagina and uterus normal.      Pelvic exam was performed with patient supine.     Labial bartholins normal.There is no lesion on the right labia. There is no lesion on the left labia. Cervix is normal. Right adnexum displays no mass and no tenderness. Left adnexum displays no mass and no tenderness. Cervix exhibits no discharge. Uterus is not enlarged and not tender.              Lymphadenopathy:        Head (right side): No submental and no submandibular adenopathy present.        Head (left side): No submental and no submandibular  adenopathy present.     She has no cervical adenopathy.    Neurological: She is alert and oriented to person, place, and time.    Skin: Skin is warm.    Psychiatric: She has a normal mood and affect. Her speech is normal and behavior is normal. Thought content normal.        Assessment:      No diagnosis found.             Plan:             Pap discussed will return for her annual     Pt is aware we call all results. If she does not hear from our office regarding her result within a week of having a study or procedure performed she is to call the office so that we can research the result for her.  Birth control discussed  Chaperone was present

## 2024-06-17 ENCOUNTER — TELEPHONE (OUTPATIENT)
Dept: OBSTETRICS AND GYNECOLOGY | Facility: CLINIC | Age: 39
End: 2024-06-17
Payer: COMMERCIAL

## 2024-06-17 NOTE — TELEPHONE ENCOUNTER
Patient text me on my personal cell phone.  She reports an abscess in her groin area.  Dr. Navas examined her and advise her to have an I and D with her Dermatologist. He prescribed Bactrim for her.  Patient is scheduled for surgery on Wednesday 06/19/2024   She would like Dr. Johns to do the procedure at the time of her scheduled surgery    Dr. Johns is consulted and he will add the I and D to her surgery   He wants patient to Start Bactrim.     Patient notified via text.

## 2024-06-19 ENCOUNTER — OUTSIDE PLACE OF SERVICE (OUTPATIENT)
Dept: OBSTETRICS AND GYNECOLOGY | Facility: CLINIC | Age: 39
End: 2024-06-19
Payer: COMMERCIAL

## 2024-06-19 LAB — B-HCG UR QL: NEGATIVE

## 2024-06-19 PROCEDURE — 58558 HYSTEROSCOPY BIOPSY: CPT | Mod: ,,, | Performed by: OBSTETRICS & GYNECOLOGY

## 2024-06-20 ENCOUNTER — TELEPHONE (OUTPATIENT)
Dept: OBSTETRICS AND GYNECOLOGY | Facility: CLINIC | Age: 39
End: 2024-06-20
Payer: COMMERCIAL

## 2024-06-20 NOTE — TELEPHONE ENCOUNTER
Spoke to post op patient. She is doing well with no concerns. She is voiding. Patient is set up for her post op appointment with Dr. Johns. Advised patient to contact our office sooner if she has any questions or concerns.

## 2024-06-21 ENCOUNTER — PATIENT MESSAGE (OUTPATIENT)
Dept: OBSTETRICS AND GYNECOLOGY | Facility: CLINIC | Age: 39
End: 2024-06-21
Payer: COMMERCIAL

## 2024-06-25 ENCOUNTER — OFFICE VISIT (OUTPATIENT)
Dept: OBSTETRICS AND GYNECOLOGY | Facility: CLINIC | Age: 39
End: 2024-06-25
Payer: COMMERCIAL

## 2024-06-25 VITALS
DIASTOLIC BLOOD PRESSURE: 89 MMHG | HEART RATE: 104 BPM | BODY MASS INDEX: 24.48 KG/M2 | SYSTOLIC BLOOD PRESSURE: 134 MMHG | HEIGHT: 67 IN | WEIGHT: 156 LBS

## 2024-06-25 DIAGNOSIS — F41.9 ANXIETY: ICD-10-CM

## 2024-06-25 DIAGNOSIS — Z98.890 POST-OPERATIVE STATE: Primary | ICD-10-CM

## 2024-06-25 PROCEDURE — 99024 POSTOP FOLLOW-UP VISIT: CPT | Mod: S$GLB,,, | Performed by: OBSTETRICS & GYNECOLOGY

## 2024-06-25 PROCEDURE — 3075F SYST BP GE 130 - 139MM HG: CPT | Mod: CPTII,S$GLB,, | Performed by: OBSTETRICS & GYNECOLOGY

## 2024-06-25 PROCEDURE — 3079F DIAST BP 80-89 MM HG: CPT | Mod: CPTII,S$GLB,, | Performed by: OBSTETRICS & GYNECOLOGY

## 2024-06-25 PROCEDURE — 1159F MED LIST DOCD IN RCRD: CPT | Mod: CPTII,S$GLB,, | Performed by: OBSTETRICS & GYNECOLOGY

## 2024-06-25 RX ORDER — CLORAZEPATE DIPOTASSIUM 7.5 MG/1
7.5 TABLET ORAL 2 TIMES DAILY PRN
Qty: 28 TABLET | Refills: 1 | Status: SHIPPED | OUTPATIENT
Start: 2024-06-25 | End: 2024-07-25

## 2024-06-25 NOTE — PROGRESS NOTES
Subjective:       Patient ID: Mary Jane Gary is a 39 y.o. female.    Chief Complaint:  Post-op Evaluation      History of Present Illness  She is doing well.  No new health issues,  No abnormal bleeding, No GI or Gu concerns,  No dyspareunia  Status post  D&C hysteroscopy   benign path  she doing great and no cocnerns      GYN & OB History  Patient's last menstrual period was 2024 (approximate).     Date of Last Pap: 2022    OB History    Para Term  AB Living   3 3 3         SAB IAB Ectopic Multiple Live Births                  # Outcome Date GA Lbr Jonathan/2nd Weight Sex Type Anes PTL Lv   3 Term 19     Vag-Spont      2 Term 16 39w0d   M Vag-Spont      1 Term 06 38w0d   M Vag-Spont          Review of Systems  Review of Systems          Objective:    Physical Exam     Her I&D incision is good   Assessment:      No diagnosis found.             Plan:           Discussed her uterine septum     Pap discussed will return for her annual     Pt is aware we call all results. If she does not hear from our office regarding her result within a week of having a study or procedure performed she is to call the office so that we can research the result for her.  Normal activities discussed.   She is to return for any reason.      Chaperone was present

## 2024-07-05 ENCOUNTER — DOCUMENTATION ONLY (OUTPATIENT)
Dept: OBSTETRICS AND GYNECOLOGY | Facility: CLINIC | Age: 39
End: 2024-07-05
Payer: COMMERCIAL

## 2024-07-29 DIAGNOSIS — F41.9 ANXIETY: ICD-10-CM

## 2024-07-29 NOTE — TELEPHONE ENCOUNTER
Pt last seen September 2024.   Pt requesting refill on Tranxene 7.5mg  Medication pending.   Pharmacy up to date.   Please advise.  Thank you!

## 2024-07-30 ENCOUNTER — PATIENT MESSAGE (OUTPATIENT)
Dept: OBSTETRICS AND GYNECOLOGY | Facility: CLINIC | Age: 39
End: 2024-07-30
Payer: COMMERCIAL

## 2024-07-31 RX ORDER — CLORAZEPATE DIPOTASSIUM 7.5 MG/1
7.5 TABLET ORAL 2 TIMES DAILY PRN
Qty: 28 TABLET | Refills: 1 | Status: SHIPPED | OUTPATIENT
Start: 2024-07-31 | End: 2024-08-30

## 2024-08-22 ENCOUNTER — OFFICE VISIT (OUTPATIENT)
Dept: OBSTETRICS AND GYNECOLOGY | Facility: CLINIC | Age: 39
End: 2024-08-22
Payer: COMMERCIAL

## 2024-08-22 VITALS
HEIGHT: 67 IN | SYSTOLIC BLOOD PRESSURE: 122 MMHG | HEART RATE: 118 BPM | BODY MASS INDEX: 24.33 KG/M2 | DIASTOLIC BLOOD PRESSURE: 91 MMHG | WEIGHT: 155 LBS

## 2024-08-22 DIAGNOSIS — N92.0 MENORRHAGIA WITH REGULAR CYCLE: Primary | ICD-10-CM

## 2024-08-22 DIAGNOSIS — Z01.419 WELL WOMAN EXAM WITH ROUTINE GYNECOLOGICAL EXAM: ICD-10-CM

## 2024-08-22 NOTE — PROGRESS NOTES
Subjective:       Patient ID: Mary Jane Gary is a 39 y.o. female.    Chief Complaint:  Well Woman      History of Present Illness  She is doing well.  No new health issues,  No abnormal bleeding, No GI or Gu concerns,  No dyspareunia.   She is doing well since her surgery   she did not have her cycle In   she has has heavy cycles she has some cramps       HPI      GYN & OB History  Patient's last menstrual period was 2024 (approximate).     Date of Last Pap: 2022    OB History    Para Term  AB Living   3 3 3         SAB IAB Ectopic Multiple Live Births                  # Outcome Date GA Lbr Jonathan/2nd Weight Sex Type Anes PTL Lv   3 Term 19     Vag-Spont      2 Term 16 39w0d   M Vag-Spont      1 Term 06 38w0d   M Vag-Spont          Review of Systems  Review of Systems   Constitutional:  Negative for activity change.   Eyes:  Negative for visual disturbance.   Respiratory:  Negative for shortness of breath.    Cardiovascular:  Negative for chest pain.   Gastrointestinal:  Negative for abdominal pain.   Genitourinary:  Negative for vaginal bleeding.        No abnormal vaginal bleeding   Musculoskeletal:  Negative for back pain.   Integumentary:  Negative for rash and breast mass.   Neurological:  Negative for numbness.   Psychiatric/Behavioral:          No mood disturbance or changes    Breast: Negative for mass            Objective:    Physical Exam:   Constitutional: She is oriented to person, place, and time. She appears well-developed. She is cooperative.    HENT:   Head: Normocephalic.     Neck: Trachea normal. No thyromegaly present.    Cardiovascular:  Normal rate, regular rhythm and normal heart sounds.             Pulmonary/Chest: Effort normal and breath sounds normal. Right breast exhibits no mass, no nipple discharge and no skin change. Left breast exhibits no mass, no nipple discharge and no skin change.        Abdominal: Soft. There is no abdominal  tenderness. There is no rebound and no guarding.     Genitourinary:    Vagina and uterus normal.      Pelvic exam was performed with patient supine.     Labial bartholins normal.There is no lesion on the right labia. There is no lesion on the left labia. Cervix is normal. Right adnexum displays no mass and no tenderness. Left adnexum displays no mass and no tenderness. Cervix exhibits no discharge. Uterus is not enlarged and not tender.              Lymphadenopathy:        Head (right side): No submental and no submandibular adenopathy present.        Head (left side): No submental and no submandibular adenopathy present.     She has no cervical adenopathy.    Neurological: She is alert and oriented to person, place, and time.    Skin: Skin is warm.    Psychiatric: She has a normal mood and affect. Her speech is normal and behavior is normal. Thought content normal.          Assessment:        1. Menorrhagia with regular cycle    2. Well woman exam with routine gynecological exam               Plan:             Pap not done     discussed [facility] as she would do well when she is ready    Mammogram  Follow up one year or sooner if needed  Self breast exams  Consider health profile if labs not done with PCP  Recommend colonoscopy   Pt is aware we call all results. If she does not hear from our office regarding her result within a week of having a study or procedure performed she is to call the office so that we can research the result for her as I do not know when she is scheduling her procedures.   Chaperone was present

## 2024-09-10 DIAGNOSIS — F41.9 ANXIETY: ICD-10-CM

## 2024-09-10 NOTE — TELEPHONE ENCOUNTER
----- Message from Tanna Welsh sent at 9/10/2024  3:02 PM CDT -----  Type:  RX Refill Request    Who Called: Mary Jane Gary,  Refill or New Rx:refill   RX Name and Strength:clorazepate (TRANXENE) 7.5 MG Tab  How is the patient currently taking it? (ex. 1XDay) as needed  Is this a 30 day or 90 day RX:30  Preferred Pharmacy with phone number:   [facility] @ [phone]    Local or Mail Order:Local   Ordering Provider:Andreas  Would the patient rather a call back or a response via [facility]?    Best Call Back Number: [phone]  Additional Information: -

## 2024-09-11 RX ORDER — CLORAZEPATE DIPOTASSIUM 7.5 MG/1
7.5 TABLET ORAL 2 TIMES DAILY PRN
Qty: 28 TABLET | Refills: 0 | Status: SHIPPED | OUTPATIENT
Start: 2024-09-11 | End: 2024-10-11

## 2024-10-01 DIAGNOSIS — F41.9 ANXIETY: ICD-10-CM

## 2024-10-01 RX ORDER — CLORAZEPATE DIPOTASSIUM 7.5 MG/1
7.5 TABLET ORAL 2 TIMES DAILY PRN
Qty: 28 TABLET | Refills: 0 | Status: SHIPPED | OUTPATIENT
Start: 2024-10-01 | End: 2024-10-31

## 2024-10-23 DIAGNOSIS — F41.9 ANXIETY: ICD-10-CM

## 2024-10-23 RX ORDER — CLORAZEPATE DIPOTASSIUM 7.5 MG/1
7.5 TABLET ORAL 2 TIMES DAILY PRN
Qty: 28 TABLET | Refills: 0 | Status: SHIPPED | OUTPATIENT
Start: 2024-10-23 | End: 2024-11-22

## 2024-11-14 DIAGNOSIS — F41.9 ANXIETY: ICD-10-CM

## 2024-11-15 ENCOUNTER — PATIENT MESSAGE (OUTPATIENT)
Dept: OBSTETRICS AND GYNECOLOGY | Facility: CLINIC | Age: 39
End: 2024-11-15
Payer: COMMERCIAL

## 2024-11-18 RX ORDER — CLORAZEPATE DIPOTASSIUM 7.5 MG/1
7.5 TABLET ORAL 2 TIMES DAILY PRN
Qty: 28 TABLET | Refills: 0 | Status: SHIPPED | OUTPATIENT
Start: 2024-11-18 | End: 2024-12-18

## 2024-12-16 DIAGNOSIS — F41.9 ANXIETY: ICD-10-CM

## 2024-12-16 RX ORDER — CLORAZEPATE DIPOTASSIUM 7.5 MG/1
7.5 TABLET ORAL 2 TIMES DAILY PRN
Qty: 28 TABLET | Refills: 0 | Status: SHIPPED | OUTPATIENT
Start: 2024-12-16 | End: 2025-01-15

## 2024-12-16 NOTE — TELEPHONE ENCOUNTER
----- Message from Tamia sent at 12/16/2024 11:54 AM CST -----  Contact: Mary Jane  .Type:  RX Refill Request    Who Called:  Mary Jane   Refill or New Rx: Refill   RX Name and Strength: clorazepate (TRANXENE) 7.5 MG Tab   How is the patient currently taking it? (ex. 1XDay): As prescribed   Is this a 30 day or 90 day RX: 30   Preferred Pharmacy with phone number: .  [facility] [address]  Phone: [phone] Fax: [phone]    Local or Mail Order:local   Ordering Provider: Dr mccollum   Would the patient rather a call back or a response via [facility]?  Call   Best Call Back Number: .[phone]  Additional Information:

## 2025-01-02 DIAGNOSIS — F41.9 ANXIETY: ICD-10-CM

## 2025-01-02 NOTE — TELEPHONE ENCOUNTER
----- Message from Tanna sent at 1/2/2025  3:44 PM CST -----  Type:  RX Refill Request    Who Called: Mary Jane Gary    Refill or New Rx:refill   RX Name and Strength:clorazepate (TRANXENE) 7.5 MG Tab  How is the patient currently taking it? (ex. 1XDay):2X's rachel   Is this a 30 day or 90 day RX:30  Preferred Pharmacy with phone number:  [facility] [address]  Phone: [phone] Fax: [phone]  Local or Mail Order:Local   Ordering Provider:Andreas  Would the patient rather a call back or a response via [facility]?    Best Call Back Number:[phone]    Additional Information: -

## 2025-01-06 RX ORDER — CLORAZEPATE DIPOTASSIUM 7.5 MG/1
7.5 TABLET ORAL 2 TIMES DAILY PRN
Qty: 28 TABLET | Refills: 0 | Status: SHIPPED | OUTPATIENT
Start: 2025-01-06 | End: 2025-02-05

## 2025-01-23 DIAGNOSIS — F41.9 ANXIETY: ICD-10-CM

## 2025-01-23 RX ORDER — CLORAZEPATE DIPOTASSIUM 7.5 MG/1
7.5 TABLET ORAL 2 TIMES DAILY PRN
Qty: 28 TABLET | Refills: 0 | Status: SHIPPED | OUTPATIENT
Start: 2025-01-23 | End: 2025-02-22

## 2025-01-23 NOTE — TELEPHONE ENCOUNTER
Pt last seen 8/22/2024.   Pt requesting refill on Tranzene 7.5.   Medication pending.   Pharmacy up to date.   Please advise.  Thank you!

## 2025-02-12 DIAGNOSIS — F41.9 ANXIETY: ICD-10-CM

## 2025-02-12 RX ORDER — CLORAZEPATE DIPOTASSIUM 7.5 MG/1
7.5 TABLET ORAL 2 TIMES DAILY PRN
Qty: 28 TABLET | Refills: 0 | Status: SHIPPED | OUTPATIENT
Start: 2025-02-12 | End: 2025-03-14

## 2025-02-26 DIAGNOSIS — F41.9 ANXIETY: ICD-10-CM

## 2025-02-26 RX ORDER — CLORAZEPATE DIPOTASSIUM 7.5 MG/1
7.5 TABLET ORAL 2 TIMES DAILY PRN
Qty: 28 TABLET | Refills: 0 | OUTPATIENT
Start: 2025-02-26 | End: 2025-03-28

## 2025-02-27 NOTE — TELEPHONE ENCOUNTER
----- Message from Tamia sent at 2/27/2025  1:09 PM CST -----  Contact: Mary Jane  .Type:  RX Refill RequestWho Called: Mary Jane Refill or New Rx: RefillRX Name and Strength: clorazepate (TRANXENE) 7.5 MG Tab How is the patient currently taking it? (ex. 1XDay): As prescribed Is this a 30 day or 90 day RX: 30 Preferred Pharmacy with phone number: [facility]. - [address] [address]: [phone] Fax: [phone] or Mail Order: localOrdering Provider: Dr mccollum Would the patient rather a call back or a response via [facility]?  Call Best Call Back Number: [phone] Additional Information:

## 2025-02-27 NOTE — TELEPHONE ENCOUNTER
Spoke with pt to let her know Dr. Johns is out of town until next week. She is going to call her PCP to see if he'll call in this medication. If he won't she's going to call me back and I'll see if I can call it in.

## 2025-03-10 DIAGNOSIS — F41.9 ANXIETY: ICD-10-CM

## 2025-03-10 RX ORDER — CLORAZEPATE DIPOTASSIUM 7.5 MG/1
7.5 TABLET ORAL 2 TIMES DAILY PRN
Qty: 28 TABLET | Refills: 0 | Status: SHIPPED | OUTPATIENT
Start: 2025-03-10 | End: 2025-04-09

## 2025-04-01 DIAGNOSIS — F41.9 ANXIETY: ICD-10-CM

## 2025-04-01 RX ORDER — CLORAZEPATE DIPOTASSIUM 7.5 MG/1
7.5 TABLET ORAL 2 TIMES DAILY PRN
Qty: 28 TABLET | Refills: 0 | Status: SHIPPED | OUTPATIENT
Start: 2025-04-01 | End: 2025-05-01

## 2025-04-21 DIAGNOSIS — F41.9 ANXIETY: ICD-10-CM

## 2025-04-21 RX ORDER — CLORAZEPATE DIPOTASSIUM 7.5 MG/1
7.5 TABLET ORAL 2 TIMES DAILY PRN
Qty: 28 TABLET | Refills: 0 | Status: SHIPPED | OUTPATIENT
Start: 2025-04-21 | End: 2025-05-21

## 2025-05-19 DIAGNOSIS — F41.9 ANXIETY: ICD-10-CM

## 2025-05-19 RX ORDER — CLORAZEPATE DIPOTASSIUM 7.5 MG/1
7.5 TABLET ORAL 2 TIMES DAILY PRN
Qty: 28 TABLET | Refills: 0 | Status: SHIPPED | OUTPATIENT
Start: 2025-05-19 | End: 2025-06-18

## 2025-06-10 DIAGNOSIS — F41.9 ANXIETY: ICD-10-CM

## 2025-06-10 RX ORDER — CLORAZEPATE DIPOTASSIUM 7.5 MG/1
7.5 TABLET ORAL 2 TIMES DAILY PRN
Qty: 28 TABLET | Refills: 0 | Status: SHIPPED | OUTPATIENT
Start: 2025-06-10 | End: 2025-07-10

## 2025-06-19 ENCOUNTER — TELEPHONE (OUTPATIENT)
Dept: OBSTETRICS AND GYNECOLOGY | Facility: CLINIC | Age: 40
End: 2025-06-19
Payer: COMMERCIAL

## 2025-06-19 ENCOUNTER — PATIENT MESSAGE (OUTPATIENT)
Dept: OBSTETRICS AND GYNECOLOGY | Facility: CLINIC | Age: 40
End: 2025-06-19
Payer: COMMERCIAL

## 2025-06-19 NOTE — TELEPHONE ENCOUNTER
Copied from CRM #[id number]. Topic: Appointments - Appointment Access  >> Jun 19, 2025  9:51 AM Kassidy wrote:  Type:  Sooner Apoointment Request    Caller is requesting a sooner appointment.  Caller declined first available appointment listed below.  Caller will not accept being placed on the waitlist and is requesting a message be sent to doctor.  Name of Caller:Mary Jane Gary   When is the first available appointment?October/2025  Symptoms:wwe,annual  Would the patient rather a call back or [facility]? Call back  Best Call Back Number:[phone]  Additional Information: She wants to be seen after August/22/2025  Thanks!

## 2025-06-23 ENCOUNTER — TELEPHONE (OUTPATIENT)
Dept: OBSTETRICS AND GYNECOLOGY | Facility: CLINIC | Age: 40
End: 2025-06-23
Payer: COMMERCIAL

## 2025-06-23 NOTE — TELEPHONE ENCOUNTER
Copied from CRM #[id number]. Topic: General Inquiry - Patient Advice  >> Jun 23, 2025 12:36 PM Rochelle wrote:  Type:  Needs Medical Advice    Who Called: Mary Jane   Symptoms (please be specific):    How long has patient had these symptoms:    Pharmacy name and phone #:    Would the patient rather a call back or a response via [facility]? call  Best Call Back Number:  [phone] c and [phone] ext [phone]  Additional Information:  Mary Jane is requesting a callback from the nurse today in regard to her history of hormone medication.

## 2025-07-08 DIAGNOSIS — F41.9 ANXIETY: ICD-10-CM

## 2025-07-08 RX ORDER — CLORAZEPATE DIPOTASSIUM 7.5 MG/1
7.5 TABLET ORAL 2 TIMES DAILY PRN
Qty: 28 TABLET | Refills: 0 | Status: SHIPPED | OUTPATIENT
Start: 2025-07-08 | End: 2025-08-07

## 2025-07-28 ENCOUNTER — TELEPHONE (OUTPATIENT)
Dept: GASTROENTEROLOGY | Facility: CLINIC | Age: 40
End: 2025-07-28
Payer: COMMERCIAL

## 2025-07-28 NOTE — TELEPHONE ENCOUNTER
Copied from CRM #[id number]. Topic: Appointments - Appointment Access  >> Jul 24, 2025  3:02 PM Cris wrote:  Type:  Needs Medical Advice/colonoscopy     Who Called: Mary Jane   Symptoms (please be specific): colonoscopy    How long has patient had these symptoms:    Pharmacy name and phone #:    Would the patient rather a call back or a response via [facility]? Call back   Best Call Back Number: [phone]  Additional Information:  >> Jul 25, 2025 10:00 AM Med Assistant Emelia wrote:

## 2025-07-30 DIAGNOSIS — F41.9 ANXIETY: ICD-10-CM

## 2025-07-30 RX ORDER — CLORAZEPATE DIPOTASSIUM 7.5 MG/1
7.5 TABLET ORAL 2 TIMES DAILY PRN
Qty: 28 TABLET | Refills: 0 | Status: SHIPPED | OUTPATIENT
Start: 2025-07-30 | End: 2025-08-29

## 2025-07-31 NOTE — TELEPHONE ENCOUNTER
Returned call to patient. She requested OV for symptoms: bleeding hemorrhoids, anal fissure, IBS and wants to talk about having a colonoscopy. Patient was scheduled for Thursday 08/21/25 at 10 AM with [facility].  -DILIP,LPN

## 2025-08-14 ENCOUNTER — OFFICE VISIT (OUTPATIENT)
Dept: OBSTETRICS AND GYNECOLOGY | Facility: CLINIC | Age: 40
End: 2025-08-14
Payer: COMMERCIAL

## 2025-08-14 VITALS
WEIGHT: 153.81 LBS | HEART RATE: 80 BPM | SYSTOLIC BLOOD PRESSURE: 140 MMHG | DIASTOLIC BLOOD PRESSURE: 90 MMHG | BODY MASS INDEX: 24.09 KG/M2

## 2025-08-14 DIAGNOSIS — N92.1 MENORRHAGIA WITH IRREGULAR CYCLE: ICD-10-CM

## 2025-08-14 DIAGNOSIS — Z01.419 WELL WOMAN EXAM WITH ROUTINE GYNECOLOGICAL EXAM: Primary | ICD-10-CM

## 2025-08-14 DIAGNOSIS — Z12.31 SCREENING MAMMOGRAM FOR BREAST CANCER: ICD-10-CM

## 2025-08-14 DIAGNOSIS — Z11.3 ENCOUNTER FOR SCREENING FOR INFECTIONS WITH PREDOMINANTLY SEXUAL MODE OF TRANSMISSION: ICD-10-CM

## 2025-08-14 DIAGNOSIS — F41.9 ANXIETY: ICD-10-CM

## 2025-08-14 PROCEDURE — 1159F MED LIST DOCD IN RCRD: CPT | Mod: CPTII,,,

## 2025-08-14 PROCEDURE — 99396 PREV VISIT EST AGE 40-64: CPT | Mod: S$PBB,,,

## 2025-08-14 PROCEDURE — 3080F DIAST BP >= 90 MM HG: CPT | Mod: CPTII,,,

## 2025-08-14 PROCEDURE — 3077F SYST BP >= 140 MM HG: CPT | Mod: CPTII,,,

## 2025-08-14 PROCEDURE — 3008F BODY MASS INDEX DOCD: CPT | Mod: CPTII,,,

## 2025-08-14 RX ORDER — CLORAZEPATE DIPOTASSIUM 7.5 MG/1
7.5 TABLET ORAL 2 TIMES DAILY PRN
Qty: 28 TABLET | Refills: 0 | Status: SHIPPED | OUTPATIENT
Start: 2025-08-14 | End: 2025-09-13

## 2025-08-20 ENCOUNTER — TELEPHONE (OUTPATIENT)
Dept: OBSTETRICS AND GYNECOLOGY | Facility: CLINIC | Age: 40
End: 2025-08-20
Payer: COMMERCIAL

## 2025-08-20 ENCOUNTER — PATIENT MESSAGE (OUTPATIENT)
Dept: OBSTETRICS AND GYNECOLOGY | Facility: CLINIC | Age: 40
End: 2025-08-20
Payer: COMMERCIAL

## 2025-09-05 DIAGNOSIS — F41.9 ANXIETY: ICD-10-CM

## 2025-09-05 RX ORDER — CLORAZEPATE DIPOTASSIUM 7.5 MG/1
7.5 TABLET ORAL 2 TIMES DAILY PRN
Qty: 28 TABLET | Refills: 0 | Status: SHIPPED | OUTPATIENT
Start: 2025-09-05 | End: 2025-10-05